# Patient Record
Sex: MALE | Race: WHITE | NOT HISPANIC OR LATINO
[De-identification: names, ages, dates, MRNs, and addresses within clinical notes are randomized per-mention and may not be internally consistent; named-entity substitution may affect disease eponyms.]

---

## 2017-09-19 PROBLEM — Z00.00 ENCOUNTER FOR PREVENTIVE HEALTH EXAMINATION: Status: ACTIVE | Noted: 2017-09-19

## 2017-09-20 ENCOUNTER — APPOINTMENT (OUTPATIENT)
Dept: CT IMAGING | Facility: HOSPITAL | Age: 57
End: 2017-09-20
Payer: MEDICAID

## 2017-09-20 ENCOUNTER — OUTPATIENT (OUTPATIENT)
Dept: OUTPATIENT SERVICES | Facility: HOSPITAL | Age: 57
LOS: 1 days | End: 2017-09-20
Payer: MEDICAID

## 2017-09-20 DIAGNOSIS — R91.1 SOLITARY PULMONARY NODULE: ICD-10-CM

## 2017-09-20 PROCEDURE — 71250 CT THORAX DX C-: CPT

## 2017-09-20 PROCEDURE — 71250 CT THORAX DX C-: CPT | Mod: 26

## 2019-01-10 ENCOUNTER — INPATIENT (INPATIENT)
Facility: HOSPITAL | Age: 59
LOS: 4 days | Discharge: HOME | DRG: 287 | End: 2019-01-15
Attending: INTERNAL MEDICINE | Admitting: INTERNAL MEDICINE
Payer: MEDICAID

## 2019-01-10 VITALS
HEART RATE: 64 BPM | DIASTOLIC BLOOD PRESSURE: 104 MMHG | RESPIRATION RATE: 20 BRPM | OXYGEN SATURATION: 96 % | SYSTOLIC BLOOD PRESSURE: 206 MMHG | WEIGHT: 182.98 LBS

## 2019-01-10 DIAGNOSIS — R06.02 SHORTNESS OF BREATH: ICD-10-CM

## 2019-01-10 LAB
ALBUMIN SERPL ELPH-MCNC: 4.2 G/DL — SIGNIFICANT CHANGE UP (ref 3.3–5)
ALP SERPL-CCNC: 50 U/L — SIGNIFICANT CHANGE UP (ref 40–120)
ALT FLD-CCNC: 15 U/L — SIGNIFICANT CHANGE UP (ref 10–45)
ANION GAP SERPL CALC-SCNC: 12 MMOL/L — SIGNIFICANT CHANGE UP (ref 5–17)
APTT BLD: 33.2 SEC — SIGNIFICANT CHANGE UP (ref 27.5–36.3)
AST SERPL-CCNC: 21 U/L — SIGNIFICANT CHANGE UP (ref 10–40)
BASOPHILS # BLD AUTO: 0 K/UL — SIGNIFICANT CHANGE UP (ref 0–0.2)
BASOPHILS NFR BLD AUTO: 0.4 % — SIGNIFICANT CHANGE UP (ref 0–2)
BILIRUB SERPL-MCNC: 0.7 MG/DL — SIGNIFICANT CHANGE UP (ref 0.2–1.2)
BUN SERPL-MCNC: 28 MG/DL — HIGH (ref 7–23)
CALCIUM SERPL-MCNC: 9.2 MG/DL — SIGNIFICANT CHANGE UP (ref 8.4–10.5)
CHLORIDE SERPL-SCNC: 103 MMOL/L — SIGNIFICANT CHANGE UP (ref 96–108)
CK MB BLD-MCNC: 2.6 % — SIGNIFICANT CHANGE UP (ref 0–3.5)
CK MB CFR SERPL CALC: 4.2 NG/ML — SIGNIFICANT CHANGE UP (ref 0–6.7)
CK SERPL-CCNC: 163 U/L — SIGNIFICANT CHANGE UP (ref 30–200)
CO2 SERPL-SCNC: 25 MMOL/L — SIGNIFICANT CHANGE UP (ref 22–31)
CREAT SERPL-MCNC: 1.65 MG/DL — HIGH (ref 0.5–1.3)
EOSINOPHIL # BLD AUTO: 0.2 K/UL — SIGNIFICANT CHANGE UP (ref 0–0.5)
EOSINOPHIL NFR BLD AUTO: 1.8 % — SIGNIFICANT CHANGE UP (ref 0–6)
GLUCOSE SERPL-MCNC: 101 MG/DL — HIGH (ref 70–99)
HCT VFR BLD CALC: 40.3 % — SIGNIFICANT CHANGE UP (ref 39–50)
HGB BLD-MCNC: 13.4 G/DL — SIGNIFICANT CHANGE UP (ref 13–17)
INR BLD: 1.02 RATIO — SIGNIFICANT CHANGE UP (ref 0.88–1.16)
LYMPHOCYTES # BLD AUTO: 1.4 K/UL — SIGNIFICANT CHANGE UP (ref 1–3.3)
LYMPHOCYTES # BLD AUTO: 14.3 % — SIGNIFICANT CHANGE UP (ref 13–44)
MCHC RBC-ENTMCNC: 30.2 PG — SIGNIFICANT CHANGE UP (ref 27–34)
MCHC RBC-ENTMCNC: 33.2 GM/DL — SIGNIFICANT CHANGE UP (ref 32–36)
MCV RBC AUTO: 91 FL — SIGNIFICANT CHANGE UP (ref 80–100)
MONOCYTES # BLD AUTO: 0.5 K/UL — SIGNIFICANT CHANGE UP (ref 0–0.9)
MONOCYTES NFR BLD AUTO: 4.8 % — SIGNIFICANT CHANGE UP (ref 2–14)
NEUTROPHILS # BLD AUTO: 7.4 K/UL — SIGNIFICANT CHANGE UP (ref 1.8–7.4)
NEUTROPHILS NFR BLD AUTO: 78.7 % — HIGH (ref 43–77)
NT-PROBNP SERPL-SCNC: 4067 PG/ML — HIGH (ref 0–300)
PLATELET # BLD AUTO: 224 K/UL — SIGNIFICANT CHANGE UP (ref 150–400)
POTASSIUM SERPL-MCNC: 4.2 MMOL/L — SIGNIFICANT CHANGE UP (ref 3.5–5.3)
POTASSIUM SERPL-SCNC: 4.2 MMOL/L — SIGNIFICANT CHANGE UP (ref 3.5–5.3)
PROT SERPL-MCNC: 6.8 G/DL — SIGNIFICANT CHANGE UP (ref 6–8.3)
PROTHROM AB SERPL-ACNC: 11.7 SEC — SIGNIFICANT CHANGE UP (ref 10–12.9)
RBC # BLD: 4.43 M/UL — SIGNIFICANT CHANGE UP (ref 4.2–5.8)
RBC # FLD: 12.3 % — SIGNIFICANT CHANGE UP (ref 10.3–14.5)
SODIUM SERPL-SCNC: 140 MMOL/L — SIGNIFICANT CHANGE UP (ref 135–145)
TROPONIN T, HIGH SENSITIVITY RESULT: 25 NG/L — SIGNIFICANT CHANGE UP (ref 0–51)
TROPONIN T, HIGH SENSITIVITY RESULT: 27 NG/L — SIGNIFICANT CHANGE UP (ref 0–51)
TROPONIN T, HIGH SENSITIVITY RESULT: 29 NG/L — SIGNIFICANT CHANGE UP (ref 0–51)
TROPONIN T, HIGH SENSITIVITY RESULT: 30 NG/L — SIGNIFICANT CHANGE UP (ref 0–51)
WBC # BLD: 9.4 K/UL — SIGNIFICANT CHANGE UP (ref 3.8–10.5)
WBC # FLD AUTO: 9.4 K/UL — SIGNIFICANT CHANGE UP (ref 3.8–10.5)

## 2019-01-10 PROCEDURE — 93010 ELECTROCARDIOGRAM REPORT: CPT

## 2019-01-10 PROCEDURE — 99285 EMERGENCY DEPT VISIT HI MDM: CPT | Mod: 25

## 2019-01-10 PROCEDURE — 71046 X-RAY EXAM CHEST 2 VIEWS: CPT | Mod: 26

## 2019-01-10 PROCEDURE — 71250 CT THORAX DX C-: CPT | Mod: 26

## 2019-01-10 RX ORDER — FUROSEMIDE 40 MG
40 TABLET ORAL ONCE
Qty: 0 | Refills: 0 | Status: COMPLETED | OUTPATIENT
Start: 2019-01-10 | End: 2019-01-10

## 2019-01-10 RX ORDER — ASPIRIN/CALCIUM CARB/MAGNESIUM 324 MG
162 TABLET ORAL DAILY
Qty: 0 | Refills: 0 | Status: DISCONTINUED | OUTPATIENT
Start: 2019-01-10 | End: 2019-01-10

## 2019-01-10 RX ORDER — CLOPIDOGREL BISULFATE 75 MG/1
75 TABLET, FILM COATED ORAL DAILY
Qty: 0 | Refills: 0 | Status: DISCONTINUED | OUTPATIENT
Start: 2019-01-10 | End: 2019-01-11

## 2019-01-10 RX ORDER — AMLODIPINE BESYLATE 2.5 MG/1
5 TABLET ORAL ONCE
Qty: 0 | Refills: 0 | Status: COMPLETED | OUTPATIENT
Start: 2019-01-10 | End: 2019-01-10

## 2019-01-10 RX ORDER — ENOXAPARIN SODIUM 100 MG/ML
80 INJECTION SUBCUTANEOUS ONCE
Qty: 0 | Refills: 0 | Status: COMPLETED | OUTPATIENT
Start: 2019-01-10 | End: 2019-01-10

## 2019-01-10 RX ORDER — ISOSORBIDE MONONITRATE 60 MG/1
30 TABLET, EXTENDED RELEASE ORAL DAILY
Qty: 0 | Refills: 0 | Status: DISCONTINUED | OUTPATIENT
Start: 2019-01-10 | End: 2019-01-11

## 2019-01-10 RX ORDER — NICOTINE POLACRILEX 2 MG
1 GUM BUCCAL DAILY
Qty: 0 | Refills: 0 | Status: DISCONTINUED | OUTPATIENT
Start: 2019-01-10 | End: 2019-01-15

## 2019-01-10 RX ORDER — HYDRALAZINE HCL 50 MG
10 TABLET ORAL EVERY 8 HOURS
Qty: 0 | Refills: 0 | Status: DISCONTINUED | OUTPATIENT
Start: 2019-01-10 | End: 2019-01-11

## 2019-01-10 RX ORDER — FUROSEMIDE 40 MG
20 TABLET ORAL DAILY
Qty: 0 | Refills: 0 | Status: DISCONTINUED | OUTPATIENT
Start: 2019-01-11 | End: 2019-01-11

## 2019-01-10 RX ORDER — HEPARIN SODIUM 5000 [USP'U]/ML
5000 INJECTION INTRAVENOUS; SUBCUTANEOUS EVERY 12 HOURS
Qty: 0 | Refills: 0 | Status: DISCONTINUED | OUTPATIENT
Start: 2019-01-10 | End: 2019-01-15

## 2019-01-10 RX ORDER — SIMVASTATIN 20 MG/1
10 TABLET, FILM COATED ORAL AT BEDTIME
Qty: 0 | Refills: 0 | Status: DISCONTINUED | OUTPATIENT
Start: 2019-01-10 | End: 2019-01-11

## 2019-01-10 RX ORDER — ASPIRIN/CALCIUM CARB/MAGNESIUM 324 MG
81 TABLET ORAL DAILY
Qty: 0 | Refills: 0 | Status: DISCONTINUED | OUTPATIENT
Start: 2019-01-10 | End: 2019-01-15

## 2019-01-10 RX ORDER — METOPROLOL TARTRATE 50 MG
25 TABLET ORAL
Qty: 0 | Refills: 0 | Status: DISCONTINUED | OUTPATIENT
Start: 2019-01-10 | End: 2019-01-11

## 2019-01-10 RX ADMIN — Medication 1 PATCH: at 18:45

## 2019-01-10 RX ADMIN — SIMVASTATIN 10 MILLIGRAM(S): 20 TABLET, FILM COATED ORAL at 21:51

## 2019-01-10 RX ADMIN — ENOXAPARIN SODIUM 80 MILLIGRAM(S): 100 INJECTION SUBCUTANEOUS at 13:11

## 2019-01-10 RX ADMIN — Medication 40 MILLIGRAM(S): at 17:19

## 2019-01-10 RX ADMIN — Medication 162 MILLIGRAM(S): at 11:48

## 2019-01-10 RX ADMIN — Medication 25 MILLIGRAM(S): at 17:26

## 2019-01-10 RX ADMIN — HEPARIN SODIUM 5000 UNIT(S): 5000 INJECTION INTRAVENOUS; SUBCUTANEOUS at 17:26

## 2019-01-10 RX ADMIN — ISOSORBIDE MONONITRATE 30 MILLIGRAM(S): 60 TABLET, EXTENDED RELEASE ORAL at 21:51

## 2019-01-10 RX ADMIN — AMLODIPINE BESYLATE 5 MILLIGRAM(S): 2.5 TABLET ORAL at 11:48

## 2019-01-10 RX ADMIN — Medication 1 PATCH: at 21:25

## 2019-01-10 RX ADMIN — Medication 10 MILLIGRAM(S): at 21:51

## 2019-01-10 NOTE — ED ADULT NURSE NOTE - OBJECTIVE STATEMENT
59 y/o Male presenting to the ED by EMS, A&Ox3, complaining of SOB x2 weeks and a flipped T-wave on his EKG. pt Hx of hypertension, blood pressure on assessment 220/108. pt denies chest pain, back pain, dizziness, headache, palpitations, heart racing. pt is a smoker. pt on cardiac monitor, showing NSR to the 60's. EKG completed. Heart sounds WNL, Lung sounds clear bilaterally. Slight pitting edema noted bilaterally on the tibia. pt denies new calf swelling or calf pain. Safety and comfort measures provided. Awaiting lab results and chest x-ray. Family at bedside.

## 2019-01-10 NOTE — H&P ADULT - NSHPLABSRESULTS_GEN_ALL_CORE
LABS:                        13.4   9.4   )-----------( 224      ( 10 Robbin 2019 11:13 )             40.3     01-10    140  |  103  |  28<H>  ----------------------------<  101<H>  4.2   |  25  |  1.65<H>    Ca    9.2      10 Robbin 2019 11:13    TPro  6.8  /  Alb  4.2  /  TBili  0.7  /  DBili  x   /  AST  21  /  ALT  15  /  AlkPhos  50  01-10    PT/INR - ( 10 Robbin 2019 11:13 )   PT: 11.7 sec;   INR: 1.02 ratio         PTT - ( 10 Robbin 2019 11:13 )  PTT:33.2 sec

## 2019-01-10 NOTE — ED PROVIDER NOTE - OBJECTIVE STATEMENT
Pt is a 58 Y M with hx of HTN smoking who presents with increasing SOB with exertion over the past few weeks. Pt states that he has been getting more and more short of breath with activity over the past few weeks, specifically much worse the last 3 days. He admits to severe orthopnea last night that caused him to come in this morning. He denies chest pain, abdominal pain, LOC. He was prescribed amlodipine 5 for BP but doesn't currently have any of it at home. He denies nausea, vomiting, LOC.

## 2019-01-10 NOTE — CONSULT NOTE ADULT - SUBJECTIVE AND OBJECTIVE BOX
CHIEF COMPLAINT:Patient is a 58y old  Male who presents with a chief complaint of sob (10 Robbin 2019 14:23)      HPI:  a 58y Male complaining of SOB.	     58 Y M with hx of HTN smoking who presents with increasing SOB with exertion over the past few weeks.  Pt states that he has been getting more and more short of breath with activity over the past few weeks, specifically much worse the last 3 days. He admits to severe orthopnea last night that caused him to come in this morning.  He denies chest pain, abdominal pain, LOC. He was prescribed amlodipine 5 for BP but doesn't currently have any of it at home. He denies nausea, vomiting, LOC. (10 Robbin 2019 14:23)      PAST MEDICAL & SURGICAL HISTORY:      MEDICATIONS  (STANDING):  aspirin enteric coated 81 milliGRAM(s) Oral daily  heparin  Injectable 5000 Unit(s) SubCutaneous every 12 hours  metoprolol tartrate 25 milliGRAM(s) Oral two times a day  simvastatin 10 milliGRAM(s) Oral at bedtime    MEDICATIONS  (PRN):      FAMILY HISTORY:      SOCIAL HISTORY:    [ ] Non-smoker  [ ] Smoker  [ ] Alcohol    Allergies    No Known Allergies    Intolerances    	    REVIEW OF SYSTEMS:  CONSTITUTIONAL: No fever, weight loss, or fatigue  EYES: No eye pain, visual disturbances, or discharge  ENT:  No difficulty hearing, tinnitus, vertigo; No sinus or throat pain  NECK: No pain or stiffness  RESPIRATORY: No cough, wheezing, chills or hemoptysis; +Shortness of Breath  CARDIOVASCULAR: No chest pain, palpitations, passing out, dizziness, or leg swelling  GASTROINTESTINAL: No abdominal or epigastric pain. No nausea, vomiting, or hematemesis; No diarrhea or constipation. No melena or hematochezia.  GENITOURINARY: No dysuria, frequency, hematuria, or incontinence  NEUROLOGICAL: No headaches, memory loss, loss of strength, numbness, or tremors  SKIN: No itching, burning, rashes, or lesions   LYMPH Nodes: No enlarged glands  ENDOCRINE: No heat or cold intolerance; No hair loss  MUSCULOSKELETAL: No joint pain or swelling; No muscle, back, or extremity pain  PSYCHIATRIC: No depression, anxiety, mood swings, or difficulty sleeping  HEME/LYMPH: No easy bruising, or bleeding gums  ALLERGY AND IMMUNOLOGIC: No hives or eczema	    [ ] All others negative	  [ ] Unable to obtain    PHYSICAL EXAM:  T(C): 36.6 (01-10-19 @ 16:15), Max: 36.7 (01-10-19 @ 12:03)  HR: 71 (01-10-19 @ 16:15) (59 - 82)  BP: 186/90 (01-10-19 @ 16:15) (168/99 - 206/104)  RR: 16 (01-10-19 @ 16:15) (16 - 20)  SpO2: 94% (01-10-19 @ 16:15) (94% - 100%)  Wt(kg): --  I&O's Summary      Appearance: Normal	  HEENT:   Normal oral mucosa, PERRL, EOMI	  Lymphatic: No lymphadenopathy  Cardiovascular: Normal S1 S2, No JVD, + murmurs, No edema  Respiratory: Lungs clear to auscultation	  Psychiatry: A & O x 3, Mood & affect appropriate  Gastrointestinal:  Soft, Non-tender, + BS	  Skin: No rashes, No ecchymoses, No cyanosis	  Neurologic: Non-focal  Extremities: Normal range of motion, No clubbing, cyanosis or edema  Vascular: Peripheral pulses palpable 2+ bilaterally    TELEMETRY: 	    ECG:  	  RADIOLOGY:  OTHER: 	  	  LABS:	 	    CARDIAC MARKERS:                              13.4   9.4   )-----------( 224      ( 10 Robbin 2019 11:13 )             40.3     01-10    140  |  103  |  28<H>  ----------------------------<  101<H>  4.2   |  25  |  1.65<H>    Ca    9.2      10 Robbin 2019 11:13    TPro  6.8  /  Alb  4.2  /  TBili  0.7  /  DBili  x   /  AST  21  /  ALT  15  /  AlkPhos  50  01-10    proBNP: Serum Pro-Brain Natriuretic Peptide: 4067 pg/mL (01-10 @ 11:13)    Lipid Profile:   HgA1c:   TSH:   PT/INR - ( 10 Robbin 2019 11:13 )   PT: 11.7 sec;   INR: 1.02 ratio         PTT - ( 10 Robbin 2019 11:13 )  PTT:33.2 sec    PREVIOUS DIAGNOSTIC TESTING:    < from: 12 Lead ECG (01.10.19 @ 10:55) >  Diagnosis Line NORMAL SINUS RHYTHM  LEFT AXIS DEVIATION  CANNOT RULE OUT ANTEROSEPTAL INFARCT , AGE UNDETERMINED  ST & T WAVE ABNORMALITY, CONSIDER LATERAL ISCHEMIA  ABNORMAL ECG    < from: Xray Chest 2 Views PA/Lat (01.10.19 @ 11:14) >  The heart size is normal.  The cardiomediastinal silhouette is within normal limits.  The lungs are clear.  No pleural effusions or pneumothorax.    IMPRESSION:     Clear lungs.    < from: CT Chest No Cont (09.20.17 @ 09:01) >  1.3 mm left apical pulmonary nodule. If the patient has no risk factors   no follow-up is needed according to Fleischner's criteria.

## 2019-01-10 NOTE — H&P ADULT - HISTORY OF PRESENT ILLNESS
a 58y Male complaining of SOB.	     58 Y M with hx of HTN smoking who presents with increasing SOB with exertion over the past few weeks.  Pt states that he has been getting more and more short of breath with activity over the past few weeks, specifically much worse the last 3 days. He admits to severe orthopnea last night that caused him to come in this morning.  He denies chest pain, abdominal pain, LOC. He was prescribed amlodipine 5 for BP but doesn't currently have any of it at home. He denies nausea, vomiting, LOC.

## 2019-01-10 NOTE — H&P ADULT - ASSESSMENT
pt   with  h/o  htn.  not  on  any  meds    admitted  with  worsening  sob  for  past  few  weeks  from  acute  diastolic  chf/  elevated  bnp, over 4.000  tele   iv lasix/ asa/ statin/ BB  dvt ppx   card  eval  echo  will  need  cath  when  stable  PIO,  follow  labs  in  am

## 2019-01-10 NOTE — ED PROVIDER NOTE - ATTENDING CONTRIBUTION TO CARE
59 y/o male with the above documented history and HPI who on exam appears very well and comfortable. VSs noted, neck supple, lungs CTA, cardiac sounds s/ audible m/r/g, abdomen soft, NT/ND s/ palpable pulsatile mass, extremities s/ asymmetry, calf ttp or palpable cord c/ intact and symmetric peripheral pulses, skin s/ rash or significant edema and neurologically intact. His EKG reveals some non-specific changes in the lateral leads when compared to a prior EKG from his physician's office. Full investigation initiated with a presentation seemingly most c/w ACS (NSTEMI/UA) as opposed to any alternative emergent process, be it cardiac, vascular, pulmonary or otherwise. Will follow his studies but anticipate admission regardless of their results.

## 2019-01-10 NOTE — CONSULT NOTE ADULT - ASSESSMENT
pt with new onset of barcenas/sob and orthopnea.  cardiac enzyme  asa daily  echo  ? cath  bp control  repeat chest ct  tsh  dvt prophylaxis

## 2019-01-11 LAB
ANION GAP SERPL CALC-SCNC: 14 MMOL/L — SIGNIFICANT CHANGE UP (ref 5–17)
BUN SERPL-MCNC: 28 MG/DL — HIGH (ref 7–23)
CALCIUM SERPL-MCNC: 8.4 MG/DL — SIGNIFICANT CHANGE UP (ref 8.4–10.5)
CHLORIDE SERPL-SCNC: 100 MMOL/L — SIGNIFICANT CHANGE UP (ref 96–108)
CHOLEST SERPL-MCNC: 236 MG/DL — HIGH (ref 10–199)
CO2 SERPL-SCNC: 26 MMOL/L — SIGNIFICANT CHANGE UP (ref 22–31)
CREAT SERPL-MCNC: 1.62 MG/DL — HIGH (ref 0.5–1.3)
GLUCOSE SERPL-MCNC: 88 MG/DL — SIGNIFICANT CHANGE UP (ref 70–99)
HCT VFR BLD CALC: 37.2 % — LOW (ref 39–50)
HDLC SERPL-MCNC: 46 MG/DL — SIGNIFICANT CHANGE UP
HGB BLD-MCNC: 12 G/DL — LOW (ref 13–17)
LIPID PNL WITH DIRECT LDL SERPL: 157 MG/DL — HIGH
MCHC RBC-ENTMCNC: 30.1 PG — SIGNIFICANT CHANGE UP (ref 27–34)
MCHC RBC-ENTMCNC: 32.3 GM/DL — SIGNIFICANT CHANGE UP (ref 32–36)
MCV RBC AUTO: 93.2 FL — SIGNIFICANT CHANGE UP (ref 80–100)
PLATELET # BLD AUTO: 242 K/UL — SIGNIFICANT CHANGE UP (ref 150–400)
POTASSIUM SERPL-MCNC: 3.5 MMOL/L — SIGNIFICANT CHANGE UP (ref 3.5–5.3)
POTASSIUM SERPL-SCNC: 3.5 MMOL/L — SIGNIFICANT CHANGE UP (ref 3.5–5.3)
RBC # BLD: 3.99 M/UL — LOW (ref 4.2–5.8)
RBC # FLD: 13.8 % — SIGNIFICANT CHANGE UP (ref 10.3–14.5)
SODIUM SERPL-SCNC: 140 MMOL/L — SIGNIFICANT CHANGE UP (ref 135–145)
TOTAL CHOLESTEROL/HDL RATIO MEASUREMENT: 5.1 RATIO — SIGNIFICANT CHANGE UP (ref 3.4–9.6)
TRIGL SERPL-MCNC: 163 MG/DL — HIGH (ref 10–149)
TROPONIN T, HIGH SENSITIVITY RESULT: 30 NG/L — SIGNIFICANT CHANGE UP (ref 0–51)
TSH SERPL-MCNC: 1.81 UIU/ML — SIGNIFICANT CHANGE UP (ref 0.27–4.2)
WBC # BLD: 8.56 K/UL — SIGNIFICANT CHANGE UP (ref 3.8–10.5)
WBC # FLD AUTO: 8.56 K/UL — SIGNIFICANT CHANGE UP (ref 3.8–10.5)

## 2019-01-11 PROCEDURE — 99152 MOD SED SAME PHYS/QHP 5/>YRS: CPT | Mod: GC

## 2019-01-11 PROCEDURE — 93306 TTE W/DOPPLER COMPLETE: CPT | Mod: 26

## 2019-01-11 PROCEDURE — 93458 L HRT ARTERY/VENTRICLE ANGIO: CPT | Mod: 26,GC

## 2019-01-11 RX ORDER — ATORVASTATIN CALCIUM 80 MG/1
80 TABLET, FILM COATED ORAL AT BEDTIME
Qty: 0 | Refills: 0 | Status: DISCONTINUED | OUTPATIENT
Start: 2019-01-11 | End: 2019-01-11

## 2019-01-11 RX ORDER — METOPROLOL TARTRATE 50 MG
50 TABLET ORAL
Qty: 0 | Refills: 0 | Status: DISCONTINUED | OUTPATIENT
Start: 2019-01-11 | End: 2019-01-12

## 2019-01-11 RX ORDER — ATORVASTATIN CALCIUM 80 MG/1
20 TABLET, FILM COATED ORAL AT BEDTIME
Qty: 0 | Refills: 0 | Status: DISCONTINUED | OUTPATIENT
Start: 2019-01-11 | End: 2019-01-15

## 2019-01-11 RX ORDER — SODIUM CHLORIDE 9 MG/ML
1000 INJECTION INTRAMUSCULAR; INTRAVENOUS; SUBCUTANEOUS
Qty: 0 | Refills: 0 | Status: DISCONTINUED | OUTPATIENT
Start: 2019-01-11 | End: 2019-01-12

## 2019-01-11 RX ORDER — AMLODIPINE BESYLATE 2.5 MG/1
5 TABLET ORAL DAILY
Qty: 0 | Refills: 0 | Status: DISCONTINUED | OUTPATIENT
Start: 2019-01-11 | End: 2019-01-13

## 2019-01-11 RX ORDER — HYDRALAZINE HCL 50 MG
25 TABLET ORAL THREE TIMES A DAY
Qty: 0 | Refills: 0 | Status: DISCONTINUED | OUTPATIENT
Start: 2019-01-11 | End: 2019-01-11

## 2019-01-11 RX ADMIN — Medication 1 PATCH: at 12:21

## 2019-01-11 RX ADMIN — HEPARIN SODIUM 5000 UNIT(S): 5000 INJECTION INTRAVENOUS; SUBCUTANEOUS at 06:06

## 2019-01-11 RX ADMIN — Medication 25 MILLIGRAM(S): at 06:05

## 2019-01-11 RX ADMIN — SODIUM CHLORIDE 60 MILLILITER(S): 9 INJECTION INTRAMUSCULAR; INTRAVENOUS; SUBCUTANEOUS at 22:19

## 2019-01-11 RX ADMIN — Medication 20 MILLIGRAM(S): at 06:05

## 2019-01-11 RX ADMIN — Medication 1 PATCH: at 12:53

## 2019-01-11 RX ADMIN — Medication 81 MILLIGRAM(S): at 12:53

## 2019-01-11 RX ADMIN — Medication 25 MILLIGRAM(S): at 13:38

## 2019-01-11 RX ADMIN — Medication 1 PATCH: at 07:20

## 2019-01-11 RX ADMIN — Medication 1 PATCH: at 20:30

## 2019-01-11 RX ADMIN — AMLODIPINE BESYLATE 5 MILLIGRAM(S): 2.5 TABLET ORAL at 18:09

## 2019-01-11 RX ADMIN — Medication 10 MILLIGRAM(S): at 06:05

## 2019-01-11 RX ADMIN — Medication 50 MILLIGRAM(S): at 18:09

## 2019-01-11 RX ADMIN — ISOSORBIDE MONONITRATE 30 MILLIGRAM(S): 60 TABLET, EXTENDED RELEASE ORAL at 12:53

## 2019-01-11 RX ADMIN — ATORVASTATIN CALCIUM 20 MILLIGRAM(S): 80 TABLET, FILM COATED ORAL at 21:00

## 2019-01-11 RX ADMIN — CLOPIDOGREL BISULFATE 75 MILLIGRAM(S): 75 TABLET, FILM COATED ORAL at 12:53

## 2019-01-11 NOTE — PROGRESS NOTE ADULT - SUBJECTIVE AND OBJECTIVE BOX
CARDIOLOGY     PROGRESS  NOTE   ________________________________________________    CHIEF COMPLAINT:Patient is a 58y old  Male who presents with a chief complaint of sob (10 Robbin 2019 17:46)  no complain.  	  REVIEW OF SYSTEMS:  CONSTITUTIONAL: No fever, weight loss, or fatigue  EYES: No eye pain, visual disturbances, or discharge  ENT:  No difficulty hearing, tinnitus, vertigo; No sinus or throat pain  NECK: No pain or stiffness  RESPIRATORY: No cough, wheezing, chills or hemoptysis; + exertional  Shortness of Breath  CARDIOVASCULAR: No chest pain, palpitations, passing out, dizziness, or leg swelling  GASTROINTESTINAL: No abdominal or epigastric pain. No nausea, vomiting, or hematemesis; No diarrhea or constipation. No melena or hematochezia.  GENITOURINARY: No dysuria, frequency, hematuria, or incontinence  NEUROLOGICAL: No headaches, memory loss, loss of strength, numbness, or tremors  SKIN: No itching, burning, rashes, or lesions   LYMPH Nodes: No enlarged glands  ENDOCRINE: No heat or cold intolerance; No hair loss  MUSCULOSKELETAL: No joint pain or swelling; No muscle, back, or extremity pain  PSYCHIATRIC: No depression, anxiety, mood swings, or difficulty sleeping  HEME/LYMPH: No easy bruising, or bleeding gums  ALLERGY AND IMMUNOLOGIC: No hives or eczema	    [ ] All others negative	  [ ] Unable to obtain    PHYSICAL EXAM:  T(C): 36.9 (01-11-19 @ 04:14), Max: 36.9 (01-11-19 @ 04:14)  HR: 70 (01-11-19 @ 04:14) (59 - 82)  BP: 185/89 (01-11-19 @ 04:14) (146/85 - 206/104)  RR: 18 (01-11-19 @ 04:14) (16 - 20)  SpO2: 96% (01-11-19 @ 04:14) (94% - 100%)  Wt(kg): --  I&O's Summary    10 Robbin 2019 07:01  -  11 Jan 2019 07:00  --------------------------------------------------------  IN: 50 mL / OUT: 0 mL / NET: 50 mL        Appearance: Normal	  HEENT:   Normal oral mucosa, PERRL, EOMI	  Lymphatic: No lymphadenopathy  Cardiovascular: Normal S1 S2, No JVD, + murmurs, No edema  Respiratory: Lungs clear to auscultation	  Psychiatry: A & O x 3, Mood & affect appropriate  Gastrointestinal:  Soft, Non-tender, + BS	  Skin: No rashes, No ecchymoses, No cyanosis	  Neurologic: Non-focal  Extremities: Normal range of motion, No clubbing, cyanosis or edema  Vascular: Peripheral pulses palpable 2+ bilaterally    MEDICATIONS  (STANDING):  aspirin enteric coated 81 milliGRAM(s) Oral daily  clopidogrel Tablet 75 milliGRAM(s) Oral daily  furosemide   Injectable 20 milliGRAM(s) IV Push daily  heparin  Injectable 5000 Unit(s) SubCutaneous every 12 hours  hydrALAZINE 10 milliGRAM(s) Oral every 8 hours  isosorbide   mononitrate ER Tablet (IMDUR) 30 milliGRAM(s) Oral daily  metoprolol tartrate 25 milliGRAM(s) Oral two times a day  nicotine - 21 mG/24Hr(s) Patch 1 patch Transdermal daily  simvastatin 10 milliGRAM(s) Oral at bedtime      TELEMETRY: 	    ECG:  	  RADIOLOGY:  OTHER: 	  	  LABS:	 	    CARDIAC MARKERS:  CARDIAC MARKERS ( 10 Robbin 2019 18:36 )  x     / x     / 163 U/L / x     / 4.2 ng/mL    < from: CT Chest No Cont (09.20.17 @ 09:01) >  1.3 mm left apical pulmonary nodule. If the patient has no risk factors   no follow-up is needed according to Fleischner's criteria.    < end of copied text >                              13.4   9.4   )-----------( 224      ( 10 Robbin 2019 11:13 )             40.3     01-11    140  |  100  |  28<H>  ----------------------------<  88  3.5   |  26  |  1.62<H>    Ca    8.4      11 Jan 2019 04:41    TPro  6.8  /  Alb  4.2  /  TBili  0.7  /  DBili  x   /  AST  21  /  ALT  15  /  AlkPhos  50  01-10    proBNP: Serum Pro-Brain Natriuretic Peptide: 4067 pg/mL (01-10 @ 11:13)    Lipid Profile:   HgA1c:   TSH:   PT/INR - ( 10 Robbin 2019 11:13 )   PT: 11.7 sec;   INR: 1.02 ratio         PTT - ( 10 Robbin 2019 11:13 )  PTT:33.2 sec      Assessment and plan  ---------------------------  dc lasix  will adjust cardiac meds  asa/plavix  cath today

## 2019-01-11 NOTE — PROGRESS NOTE ADULT - SUBJECTIVE AND OBJECTIVE BOX
no  cp/ less  sob    REVIEW OF SYSTEMS:  GEN: no fever,    no chills  RESP: no SOB,   no cough  CVS: no chest pain,   no palpitations  GI: no abdominal pain,   no nausea,   no vomiting,   no constipation,   no diarrhea  : no dysuria,   no frequency  NEURO: no headache,   no dizziness  PSYCH: no depression,   not anxious  Derm : no rash    MEDICATIONS  (STANDING):  aspirin enteric coated 81 milliGRAM(s) Oral daily  atorvastatin 80 milliGRAM(s) Oral at bedtime  clopidogrel Tablet 75 milliGRAM(s) Oral daily  heparin  Injectable 5000 Unit(s) SubCutaneous every 12 hours  hydrALAZINE 25 milliGRAM(s) Oral three times a day  isosorbide   mononitrate ER Tablet (IMDUR) 30 milliGRAM(s) Oral daily  metoprolol tartrate 25 milliGRAM(s) Oral two times a day  nicotine - 21 mG/24Hr(s) Patch 1 patch Transdermal daily  sodium chloride 0.9%. 1000 milliLiter(s) (60 mL/Hr) IV Continuous <Continuous>    MEDICATIONS  (PRN):      Vital Signs Last 24 Hrs  T(C): 36.9 (11 Jan 2019 04:14), Max: 36.9 (11 Jan 2019 04:14)  T(F): 98.5 (11 Jan 2019 04:14), Max: 98.5 (11 Jan 2019 04:14)  HR: 70 (11 Jan 2019 08:58) (59 - 82)  BP: 147/80 (11 Jan 2019 08:58) (146/85 - 206/104)  BP(mean): --  RR: 18 (11 Jan 2019 04:14) (16 - 20)  SpO2: 96% (11 Jan 2019 04:14) (94% - 100%)  CAPILLARY BLOOD GLUCOSE        I&O's Summary    10 Robbin 2019 07:01  -  11 Jan 2019 07:00  --------------------------------------------------------  IN: 50 mL / OUT: 0 mL / NET: 50 mL    11 Jan 2019 07:01  -  11 Jan 2019 09:54  --------------------------------------------------------  IN: 0 mL / OUT: 400 mL / NET: -400 mL        PHYSICAL EXAM:  HEAD:  Atraumatic, Normocephalic  NECK: Supple, No   JVD  CHEST/LUNG:   no     rales,     no,    rhonchi  HEART: Regular rate and rhythm;         murmur  ABDOMEN: Soft, Nontender, ;   EXTREMITIES:     less   edema  NEUROLOGY:  alert    LABS:                        12.0   8.56  )-----------( 242      ( 11 Jan 2019 08:25 )             37.2     01-11    140  |  100  |  28<H>  ----------------------------<  88  3.5   |  26  |  1.62<H>    Ca    8.4      11 Jan 2019 04:41    TPro  6.8  /  Alb  4.2  /  TBili  0.7  /  DBili  x   /  AST  21  /  ALT  15  /  AlkPhos  50  01-10    PT/INR - ( 10 Robbin 2019 11:13 )   PT: 11.7 sec;   INR: 1.02 ratio         PTT - ( 10 Robbin 2019 11:13 )  PTT:33.2 sec  CARDIAC MARKERS ( 10 Robbin 2019 18:36 )  x     / x     / 163 U/L / x     / 4.2 ng/mL                        Consultant(s) Notes Reviewed:      Care Discussed with Consultants/Other Providers:

## 2019-01-11 NOTE — PROGRESS NOTE ADULT - ASSESSMENT
pt   with  h/o  htn.  not  on  any  meds    admitted  with  worsening  sob  for  past  few  weeks  from  acute  diastolic  chf/  elevated  bnp, over 4.000  tele,  nsr   iv lasix/ asa/ statin/ BB  dvt ppx   card   echo  cath today  PIO,  follow  labs  in  am pt   with  h/o  htn.  not  on  any  meds    admitted  with  worsening  sob  for  past  few  weeks  from  acute  diastolic  chf/  elevated  bnp, over 4.000  tele,  nsr   iv lasix/ asa/ statin/ BB  dvt ppx   card   echo  cath today  PIO,  follow  labs  in  am  ct  chest  noted.  needs  f/p  ct in few  months     < from: CT Chest No Cont (01.10.19 @ 23:28) >    IMPRESSION:   Small pleural and small pericardial effusions are new from 2017.   Increased number of nonenlarged mediastinal lymph nodes are slightly   increased in size and number compared to the prior study.  2 mm left apical nodules unchanged.  < end of copied text >

## 2019-01-12 LAB
ANION GAP SERPL CALC-SCNC: 14 MMOL/L — SIGNIFICANT CHANGE UP (ref 5–17)
BUN SERPL-MCNC: 29 MG/DL — HIGH (ref 7–23)
CALCIUM SERPL-MCNC: 8.6 MG/DL — SIGNIFICANT CHANGE UP (ref 8.4–10.5)
CHLORIDE SERPL-SCNC: 102 MMOL/L — SIGNIFICANT CHANGE UP (ref 96–108)
CO2 SERPL-SCNC: 22 MMOL/L — SIGNIFICANT CHANGE UP (ref 22–31)
CREAT SERPL-MCNC: 1.45 MG/DL — HIGH (ref 0.5–1.3)
GLUCOSE SERPL-MCNC: 86 MG/DL — SIGNIFICANT CHANGE UP (ref 70–99)
HCT VFR BLD CALC: 38.5 % — LOW (ref 39–50)
HGB BLD-MCNC: 12.5 G/DL — LOW (ref 13–17)
MCHC RBC-ENTMCNC: 30.2 PG — SIGNIFICANT CHANGE UP (ref 27–34)
MCHC RBC-ENTMCNC: 32.5 GM/DL — SIGNIFICANT CHANGE UP (ref 32–36)
MCV RBC AUTO: 93 FL — SIGNIFICANT CHANGE UP (ref 80–100)
PLATELET # BLD AUTO: 249 K/UL — SIGNIFICANT CHANGE UP (ref 150–400)
POTASSIUM SERPL-MCNC: 3.4 MMOL/L — LOW (ref 3.5–5.3)
POTASSIUM SERPL-SCNC: 3.4 MMOL/L — LOW (ref 3.5–5.3)
RBC # BLD: 4.14 M/UL — LOW (ref 4.2–5.8)
RBC # FLD: 13.7 % — SIGNIFICANT CHANGE UP (ref 10.3–14.5)
SODIUM SERPL-SCNC: 138 MMOL/L — SIGNIFICANT CHANGE UP (ref 135–145)
WBC # BLD: 9.37 K/UL — SIGNIFICANT CHANGE UP (ref 3.8–10.5)
WBC # FLD AUTO: 9.37 K/UL — SIGNIFICANT CHANGE UP (ref 3.8–10.5)

## 2019-01-12 PROCEDURE — 74176 CT ABD & PELVIS W/O CONTRAST: CPT | Mod: 26

## 2019-01-12 PROCEDURE — 99223 1ST HOSP IP/OBS HIGH 75: CPT

## 2019-01-12 RX ORDER — METOPROLOL TARTRATE 50 MG
75 TABLET ORAL
Qty: 0 | Refills: 0 | Status: DISCONTINUED | OUTPATIENT
Start: 2019-01-12 | End: 2019-01-15

## 2019-01-12 RX ORDER — POTASSIUM CHLORIDE 20 MEQ
10 PACKET (EA) ORAL ONCE
Qty: 0 | Refills: 0 | Status: COMPLETED | OUTPATIENT
Start: 2019-01-12 | End: 2019-01-12

## 2019-01-12 RX ADMIN — SODIUM CHLORIDE 60 MILLILITER(S): 9 INJECTION INTRAMUSCULAR; INTRAVENOUS; SUBCUTANEOUS at 09:26

## 2019-01-12 RX ADMIN — Medication 50 MILLIGRAM(S): at 06:13

## 2019-01-12 RX ADMIN — Medication 1 PATCH: at 07:02

## 2019-01-12 RX ADMIN — HEPARIN SODIUM 5000 UNIT(S): 5000 INJECTION INTRAVENOUS; SUBCUTANEOUS at 17:52

## 2019-01-12 RX ADMIN — HEPARIN SODIUM 5000 UNIT(S): 5000 INJECTION INTRAVENOUS; SUBCUTANEOUS at 06:13

## 2019-01-12 RX ADMIN — Medication 1 PATCH: at 12:26

## 2019-01-12 RX ADMIN — Medication 10 MILLIEQUIVALENT(S): at 12:38

## 2019-01-12 RX ADMIN — Medication 1 PATCH: at 12:38

## 2019-01-12 RX ADMIN — ATORVASTATIN CALCIUM 20 MILLIGRAM(S): 80 TABLET, FILM COATED ORAL at 21:19

## 2019-01-12 RX ADMIN — Medication 75 MILLIGRAM(S): at 17:52

## 2019-01-12 RX ADMIN — Medication 1 PATCH: at 21:55

## 2019-01-12 RX ADMIN — Medication 81 MILLIGRAM(S): at 12:38

## 2019-01-12 RX ADMIN — AMLODIPINE BESYLATE 5 MILLIGRAM(S): 2.5 TABLET ORAL at 06:13

## 2019-01-12 RX ADMIN — Medication 1 PATCH: at 11:12

## 2019-01-12 NOTE — PROGRESS NOTE ADULT - ASSESSMENT
pt   with  h/o  htn.  not  on  any  meds    admitted  with  worsening  sob  for  past  few  weeks  from  acute  diastolic  chf/  elevated  bnp, over 4.000  tele,  nsr   iv lasix/ asa/ statin/ BB  dvt ppx   card   echo, normal  ef  cath ,  no obstructive  cad  ct  chest  noted.  needs  f/p  ct in few  months  plan d.c today      < from: Transthoracic Echocardiogram (01.11.19 @ 08:38) >  1. Mitral annular calcification. Thickened mitral valve  leaflets. Mild-moderate mitral regurgitation.  2. Calcified aortic valve with decreased opening. Peak  transaortic valve gradient equals 29 mm Hg, mean  transaortic valve gradient equals 14 mm Hg, estimated  aortic valve area equals 1.5 sqcm (by continuity equation),  aortic valve velocity time integral equals 50 cm,  consistent with moderate aortic stenosis. Mild, eccentric  aortic regurgitation.  3. Moderate left atrial enlargement.  4. Mild to moderate concentric left ventricular  hypertrophy.  5. Normal left ventricular systolic function. No segmental  wall motion abnormalities.  6. Mild diastolic dysfunction (Stage I).  7. Normal right ventricular size and function.  8. Normal pericardium with small pericardial effusion.  There is some buckling of the right atrium in diastole, but  no right ventricular collapse. The inferior vena cava  collapses with respiration. No evidence of tamponade  physiology.    < end of copied text >       < from: CT Chest No Cont (01.10.19 @ 23:28) >    IMPRESSION:   Small pleural and small pericardial effusions are new from 2017.   Increased number of nonenlarged mediastinal lymph nodes are slightly   increased in size and number compared to the prior study.  2 mm left apical nodules unchanged.  < end of copied text >

## 2019-01-12 NOTE — CONSULT NOTE ADULT - ASSESSMENT
59 y/o M smoker admitted for dyspnea found to have HFpEF and stable 2mm pulmonary nodule.    - Can repeat CT scan in 12 months if desired  - Continue diruesis   - Please recall with any questions 57 y/o M smoker admitted for dyspnea found to have HFpEF and stable 2mm pulmonary nodule.    - Can repeat CT scan in 12 months if desired  - Continue diruesis   - Smoking cessation  - Please recall with any questions

## 2019-01-12 NOTE — PROGRESS NOTE ADULT - SUBJECTIVE AND OBJECTIVE BOX
no  cp.sob  REVIEW OF SYSTEMS:  GEN: no fever,    no chills  RESP: no SOB,   no cough  CVS: no chest pain,   no palpitations  GI: no abdominal pain,   no nausea,   no vomiting,   no constipation,   no diarrhea  : no dysuria,   no frequency  NEURO: no headache,   no dizziness  PSYCH: no depression,   not anxious  Derm : no rash    MEDICATIONS  (STANDING):  amLODIPine   Tablet 5 milliGRAM(s) Oral daily  aspirin enteric coated 81 milliGRAM(s) Oral daily  atorvastatin 20 milliGRAM(s) Oral at bedtime  heparin  Injectable 5000 Unit(s) SubCutaneous every 12 hours  metoprolol tartrate 50 milliGRAM(s) Oral two times a day  nicotine - 21 mG/24Hr(s) Patch 1 patch Transdermal daily  sodium chloride 0.9%. 1000 milliLiter(s) (60 mL/Hr) IV Continuous <Continuous>    MEDICATIONS  (PRN):      Vital Signs Last 24 Hrs  T(C): 36.8 (12 Jan 2019 04:27), Max: 36.8 (12 Jan 2019 04:27)  T(F): 98.3 (12 Jan 2019 04:27), Max: 98.3 (12 Jan 2019 04:27)  HR: 65 (12 Jan 2019 06:08) (52 - 73)  BP: 159/87 (12 Jan 2019 06:08) (144/75 - 167/83)  BP(mean): --  RR: 19 (12 Jan 2019 04:27) (16 - 19)  SpO2: 94% (12 Jan 2019 04:27) (94% - 98%)  CAPILLARY BLOOD GLUCOSE        I&O's Summary    11 Jan 2019 07:01  -  12 Jan 2019 07:00  --------------------------------------------------------  IN: 1370 mL / OUT: 400 mL / NET: 970 mL        PHYSICAL EXAM:  HEAD:  Atraumatic, Normocephalic  NECK: Supple, No   JVD  CHEST/LUNG:   no     rales,     no,    rhonchi  HEART: Regular rate and rhythm;         murmur  ABDOMEN: Soft, Nontender, ;   EXTREMITIES:   no     edema  NEUROLOGY:  alert    LABS:                        12.5   9.37  )-----------( 249      ( 12 Jan 2019 07:14 )             38.5     01-12    138  |  102  |  29<H>  ----------------------------<  86  3.4<L>   |  22  |  1.45<H>    Ca    8.6      12 Jan 2019 05:56    TPro  6.8  /  Alb  4.2  /  TBili  0.7  /  DBili  x   /  AST  21  /  ALT  15  /  AlkPhos  50  01-10    PT/INR - ( 10 Robbin 2019 11:13 )   PT: 11.7 sec;   INR: 1.02 ratio         PTT - ( 10 Robbin 2019 11:13 )  PTT:33.2 sec  CARDIAC MARKERS ( 10 Robbin 2019 18:36 )  x     / x     / 163 U/L / x     / 4.2 ng/mL                Thyroid Stimulating Hormone, Serum: 1.81 uIU/mL (01-11 @ 08:30)          Consultant(s) Notes Reviewed:      Care Discussed with Consultants/Other Providers:

## 2019-01-12 NOTE — CONSULT NOTE ADULT - SUBJECTIVE AND OBJECTIVE BOX
CHIEF COMPLAINT: SOB    HPI: 59 y/o smoker w/no significant PMH, 2mm L apical pulmonary nodule seen on CT scan 9/2018 presenting with dyspnea, found to have HFpEF currently receiving diuresis, found to have stable pulmonary nodule on repeat chest CT.    REVIEW OF SYSTEMS:  See above. ROS otherwise negative    PAST MEDICAL & SURGICAL HISTORY:  HTN    FAMILY HISTORY:      SOCIAL HISTORY:  Smoking: [ ] Never Smoked [ ] Former Smoker (__ packs x ___ years) [ ] Current Smoker  (__ packs x ___ years)  Substance Use: [ ] Never Used [ ] Used ____  EtOH Use:  Marital Status: [ ] Single [ ]  [ ]  [ ]   Sexual History:   Occupation:  Recent Travel:  Country of Birth:  Advance Directives:    Allergies    No Known Allergies    Intolerances        HOME MEDICATIONS:  Home Medications:        OBJECTIVE:  ICU Vital Signs Last 24 Hrs  T(C): 36.9 (12 Jan 2019 11:15), Max: 36.9 (12 Jan 2019 11:15)  T(F): 98.5 (12 Jan 2019 11:15), Max: 98.5 (12 Jan 2019 11:15)  HR: 7 (12 Jan 2019 11:15) (7 - 73)  BP: 157/93 (12 Jan 2019 11:15) (144/75 - 162/85)  BP(mean): --  ABP: --  ABP(mean): --  RR: 18 (12 Jan 2019 11:15) (16 - 19)  SpO2: 95% (12 Jan 2019 11:15) (94% - 98%)        01-11 @ 07:01  -  01-12 @ 07:00  --------------------------------------------------------  IN: 1370 mL / OUT: 400 mL / NET: 970 mL      CAPILLARY BLOOD GLUCOSE          PHYSICAL EXAM:  General:   HEENT:   Lymph Nodes:  Neck:   Respiratory:   Cardiovascular:   Abdomen:   Extremities:   Skin:   Neurological:  Psychiatry:    HOSPITAL MEDICATIONS:  Standing Meds:  amLODIPine   Tablet 5 milliGRAM(s) Oral daily  aspirin enteric coated 81 milliGRAM(s) Oral daily  atorvastatin 20 milliGRAM(s) Oral at bedtime  heparin  Injectable 5000 Unit(s) SubCutaneous every 12 hours  metoprolol tartrate 75 milliGRAM(s) Oral two times a day  nicotine - 21 mG/24Hr(s) Patch 1 patch Transdermal daily      PRN Meds:      LABS:                        12.5   9.37  )-----------( 249      ( 12 Jan 2019 07:14 )             38.5     Hgb Trend: 12.5<--, 12.0<--, 13.4<--  01-12    138  |  102  |  29<H>  ----------------------------<  86  3.4<L>   |  22  |  1.45<H>    Ca    8.6      12 Jan 2019 05:56      Creatinine Trend: 1.45<--, 1.62<--, 1.65<--            MICROBIOLOGY:       RADIOLOGY:  [x ] Reviewed and interpreted by me    < from: CT Chest No Cont (01.10.19 @ 23:28) >  FINDINGS:     AIRWAYS AND LUNGS: The central tracheobronchial tree is patent.  2 mm   left apical nodule is unchanged (3, 23).    MEDIASTINUM AND PLEURA: Increased number of nonenlarged mediastinal lymph   nodes are slightly increased in size and number compared to the prior   study. The visualized portion of the thyroid gland is unremarkable. There   are small bilateral pleural effusions.  There is no pneumothorax.      HEART AND VESSELS: The heart is normal in size.  There are   atherosclerotic calcifications of the aorta and coronary arteries.  There   is a small pericardial effusion.  Aortic valve calcifications    UPPER ABDOMEN: Images of the upper abdomen demonstrate no abnormality.     BONES AND SOFT TISSUES: There are mild degenerative changes of the spine.    The soft tissues are unremarkable.    IMPRESSION:   Small pleural and small pericardial effusions are new from 2017.   Increased number of nonenlarged mediastinal lymph nodes are slightly   increased in size and number compared to the prior study.    2 mm left apical nodules unchanged.    < end of copied text >      PULMONARY FUNCTION TESTS:    EKG: CHIEF COMPLAINT: SOB    HPI: 59 y/o smoker w/no significant PMH, 2mm L apical pulmonary nodule seen on CT scan 9/2018 presenting with dyspnea, found to have HFpEF currently receiving diuresis, found to have stable pulmonary nodule on repeat chest CT. Currently feels well. Breathing significantly improved from admission, however still has some dyspnea on exertion. No chest pain. No fevers, chills, nausea, emesis, or weight loss.     REVIEW OF SYSTEMS:  See above. ROS otherwise negative    PAST MEDICAL & SURGICAL HISTORY:  HTN    FAMILY HISTORY:  No family history of cancer    SOCIAL HISTORY:  Smoker 1ppd x ~ 40 years    Allergies    No Known Allergies    Intolerances        HOME MEDICATIONS:  Home Medications:        OBJECTIVE:  ICU Vital Signs Last 24 Hrs  T(C): 36.9 (12 Jan 2019 11:15), Max: 36.9 (12 Jan 2019 11:15)  T(F): 98.5 (12 Jan 2019 11:15), Max: 98.5 (12 Jan 2019 11:15)  HR: 7 (12 Jan 2019 11:15) (7 - 73)  BP: 157/93 (12 Jan 2019 11:15) (144/75 - 162/85)  BP(mean): --  ABP: --  ABP(mean): --  RR: 18 (12 Jan 2019 11:15) (16 - 19)  SpO2: 95% (12 Jan 2019 11:15) (94% - 98%)        01-11 @ 07:01  -  01-12 @ 07:00  --------------------------------------------------------  IN: 1370 mL / OUT: 400 mL / NET: 970 mL      CAPILLARY BLOOD GLUCOSE          PHYSICAL EXAM:  General: Adult male sitting comfortably in suad, NAD  HEENT: NC/AT sclerae anicteric  Neck: Supple  Respiratory: No increased WOB, CTAB  Cardiovascular: S1, S2  Abdomen: Soft, + BS  Extremities: WWP  Skin: Intact  Neurological: Awake, alert, follows commands, moves all extremities  Psychiatry: Appropriate affect    HOSPITAL MEDICATIONS:  Standing Meds:  amLODIPine   Tablet 5 milliGRAM(s) Oral daily  aspirin enteric coated 81 milliGRAM(s) Oral daily  atorvastatin 20 milliGRAM(s) Oral at bedtime  heparin  Injectable 5000 Unit(s) SubCutaneous every 12 hours  metoprolol tartrate 75 milliGRAM(s) Oral two times a day  nicotine - 21 mG/24Hr(s) Patch 1 patch Transdermal daily      PRN Meds:      LABS:                        12.5   9.37  )-----------( 249      ( 12 Jan 2019 07:14 )             38.5     Hgb Trend: 12.5<--, 12.0<--, 13.4<--  01-12    138  |  102  |  29<H>  ----------------------------<  86  3.4<L>   |  22  |  1.45<H>    Ca    8.6      12 Jan 2019 05:56      Creatinine Trend: 1.45<--, 1.62<--, 1.65<--            MICROBIOLOGY:       RADIOLOGY:  [x ] Reviewed and interpreted by me    < from: CT Chest No Cont (01.10.19 @ 23:28) >  FINDINGS:     AIRWAYS AND LUNGS: The central tracheobronchial tree is patent.  2 mm   left apical nodule is unchanged (3, 23).    MEDIASTINUM AND PLEURA: Increased number of nonenlarged mediastinal lymph   nodes are slightly increased in size and number compared to the prior   study. The visualized portion of the thyroid gland is unremarkable. There   are small bilateral pleural effusions.  There is no pneumothorax.      HEART AND VESSELS: The heart is normal in size.  There are   atherosclerotic calcifications of the aorta and coronary arteries.  There   is a small pericardial effusion.  Aortic valve calcifications    UPPER ABDOMEN: Images of the upper abdomen demonstrate no abnormality.     BONES AND SOFT TISSUES: There are mild degenerative changes of the spine.    The soft tissues are unremarkable.    IMPRESSION:   Small pleural and small pericardial effusions are new from 2017.   Increased number of nonenlarged mediastinal lymph nodes are slightly   increased in size and number compared to the prior study.    2 mm left apical nodules unchanged.    < end of copied text >      PULMONARY FUNCTION TESTS:    EKG:

## 2019-01-12 NOTE — PROGRESS NOTE ADULT - SUBJECTIVE AND OBJECTIVE BOX
CARDIOLOGY     PROGRESS  NOTE   ________________________________________________    CHIEF COMPLAINT:Patient is a 58y old  Male who presents with a chief complaint of sob (12 Jan 2019 09:19)  no complain.  	  REVIEW OF SYSTEMS:  CONSTITUTIONAL: No fever, weight loss, or fatigue  EYES: No eye pain, visual disturbances, or discharge  ENT:  No difficulty hearing, tinnitus, vertigo; No sinus or throat pain  NECK: No pain or stiffness  RESPIRATORY: No cough, wheezing, chills or hemoptysis; No Shortness of Breath  CARDIOVASCULAR: No chest pain, palpitations, passing out, dizziness, or leg swelling  GASTROINTESTINAL: No abdominal or epigastric pain. No nausea, vomiting, or hematemesis; No diarrhea or constipation. No melena or hematochezia.  GENITOURINARY: No dysuria, frequency, hematuria, or incontinence  NEUROLOGICAL: No headaches, memory loss, loss of strength, numbness, or tremors  SKIN: No itching, burning, rashes, or lesions   LYMPH Nodes: No enlarged glands  ENDOCRINE: No heat or cold intolerance; No hair loss  MUSCULOSKELETAL: No joint pain or swelling; No muscle, back, or extremity pain  PSYCHIATRIC: No depression, anxiety, mood swings, or difficulty sleeping  HEME/LYMPH: No easy bruising, or bleeding gums  ALLERGY AND IMMUNOLOGIC: No hives or eczema	    [ ] All others negative	  [ ] Unable to obtain    PHYSICAL EXAM:  T(C): 36.8 (01-12-19 @ 04:27), Max: 36.8 (01-12-19 @ 04:27)  HR: 65 (01-12-19 @ 06:08) (52 - 73)  BP: 159/87 (01-12-19 @ 06:08) (144/75 - 167/83)  RR: 19 (01-12-19 @ 04:27) (16 - 19)  SpO2: 94% (01-12-19 @ 04:27) (94% - 98%)  Wt(kg): --  I&O's Summary    11 Jan 2019 07:01  -  12 Jan 2019 07:00  --------------------------------------------------------  IN: 1370 mL / OUT: 400 mL / NET: 970 mL        Appearance: Normal	  HEENT:   Normal oral mucosa, PERRL, EOMI	  Lymphatic: No lymphadenopathy  Cardiovascular: Normal S1 S2, No JVD, + murmurs, No edema  Respiratory: Lungs clear to auscultation	  Psychiatry: A & O x 3, Mood & affect appropriate  Gastrointestinal:  Soft, Non-tender, + BS	  Skin: No rashes, No ecchymoses, No cyanosis	  Neurologic: Non-focal  Extremities: Normal range of motion, No clubbing, cyanosis or edema  Vascular: Peripheral pulses palpable 2+ bilaterally    MEDICATIONS  (STANDING):  amLODIPine   Tablet 5 milliGRAM(s) Oral daily  aspirin enteric coated 81 milliGRAM(s) Oral daily  atorvastatin 20 milliGRAM(s) Oral at bedtime  heparin  Injectable 5000 Unit(s) SubCutaneous every 12 hours  metoprolol tartrate 50 milliGRAM(s) Oral two times a day  nicotine - 21 mG/24Hr(s) Patch 1 patch Transdermal daily      TELEMETRY: 	    ECG:  	  RADIOLOGY:  OTHER: 	  	  LABS:	 	    CARDIAC MARKERS:  CARDIAC MARKERS ( 10 Robbin 2019 18:36 )  x     / x     / 163 U/L / x     / 4.2 ng/mL                                12.5   9.37  )-----------( 249      ( 12 Jan 2019 07:14 )             38.5     01-12    138  |  102  |  29<H>  ----------------------------<  86  3.4<L>   |  22  |  1.45<H>    Ca    8.6      12 Jan 2019 05:56    TPro  6.8  /  Alb  4.2  /  TBili  0.7  /  DBili  x   /  AST  21  /  ALT  15  /  AlkPhos  50  01-10    proBNP: Serum Pro-Brain Natriuretic Peptide: 4067 pg/mL (01-10 @ 11:13)    Lipid Profile: Cholesterol 236    HDL 46      HgA1c:   TSH: Thyroid Stimulating Hormone, Serum: 1.81 uIU/mL (01-11 @ 08:30)    PT/INR - ( 10 Robbin 2019 11:13 )   PT: 11.7 sec;   INR: 1.02 ratio         PTT - ( 10 Robbin 2019 11:13 )  PTT:33.2 sec    < from: Cardiac Cath Lab - Adult (01.11.19 @ 16:56) >  (Trandate), 20 mg, IV. Heparin, 3000 units, IA.  CORONARY VESSELS: The coronary circulation is right dominant.  LM:   --  LM: Normal.  LAD:   --  LAD: Normal.  CX:   --  Circumflex: Normal.  RCA:   --  Mid RCA: There was a 30 % stenosis.  COMPLICATIONS: There were no complications.  DIAGNOSTIC RECOMMENDATIONS: The patient should continue with the present  medications.    < from: Transthoracic Echocardiogram (01.11.19 @ 08:38) >  1. Mitral annular calcification. Thickened mitral valve  leaflets. Mild-moderate mitral regurgitation.  2. Calcified aortic valve with decreased opening. Peak  transaortic valve gradient equals 29 mm Hg, mean  transaortic valve gradient equals 14 mm Hg, estimated  aortic valve area equals 1.5 sqcm (by continuity equation),  aortic valve velocity time integral equals 50 cm,  consistent with moderate aortic stenosis. Mild, eccentric  aortic regurgitation.  3. Moderate left atrial enlargement.  4. Mild to moderate concentric left ventricular  hypertrophy.  5. Normal left ventricular systolic function. No segmental  wall motion abnormalities.  6. Mild diastolic dysfunction (Stage I).  7. Normal right ventricular size and function.  8. Normal pericardium with small pericardial effusion.  There is some buckling of the right atrium in diastole, but  no right ventricular collapse. The inferior vena cava  collapses with respiration. No evidence of tamponade  physiology.    < end of copied text >  < from: CT Chest No Cont (01.10.19 @ 23:28) >  Small pleural and small pericardial effusions are new from 2017.   Increased number of nonenlarged mediastinal lymph nodes are slightly   increased in size and number compared to the prior study.    2 mm left apical nodules unchanged.    < end of copied text >      Assessment and plan  ---------------------------  will increase bp meds  ct abdomen and pelvis  ? onc eval

## 2019-01-12 NOTE — PROVIDER CONTACT NOTE (OTHER) - SITUATION
Patient's heart rate dropped to 39/mt around 4am for few seconds,Patient asymptomatic,denies any distress,no diziness

## 2019-01-13 LAB
ANION GAP SERPL CALC-SCNC: 9 MMOL/L — SIGNIFICANT CHANGE UP (ref 5–17)
BUN SERPL-MCNC: 27 MG/DL — HIGH (ref 7–23)
CALCIUM SERPL-MCNC: 9 MG/DL — SIGNIFICANT CHANGE UP (ref 8.4–10.5)
CHLORIDE SERPL-SCNC: 105 MMOL/L — SIGNIFICANT CHANGE UP (ref 96–108)
CO2 SERPL-SCNC: 24 MMOL/L — SIGNIFICANT CHANGE UP (ref 22–31)
CREAT SERPL-MCNC: 1.55 MG/DL — HIGH (ref 0.5–1.3)
GLUCOSE SERPL-MCNC: 92 MG/DL — SIGNIFICANT CHANGE UP (ref 70–99)
POTASSIUM SERPL-MCNC: 4.3 MMOL/L — SIGNIFICANT CHANGE UP (ref 3.5–5.3)
POTASSIUM SERPL-SCNC: 4.3 MMOL/L — SIGNIFICANT CHANGE UP (ref 3.5–5.3)
SODIUM SERPL-SCNC: 138 MMOL/L — SIGNIFICANT CHANGE UP (ref 135–145)

## 2019-01-13 RX ORDER — AMLODIPINE BESYLATE 2.5 MG/1
10 TABLET ORAL DAILY
Qty: 0 | Refills: 0 | Status: DISCONTINUED | OUTPATIENT
Start: 2019-01-13 | End: 2019-01-13

## 2019-01-13 RX ORDER — AMLODIPINE BESYLATE 2.5 MG/1
7.5 TABLET ORAL DAILY
Qty: 0 | Refills: 0 | Status: DISCONTINUED | OUTPATIENT
Start: 2019-01-13 | End: 2019-01-14

## 2019-01-13 RX ADMIN — AMLODIPINE BESYLATE 5 MILLIGRAM(S): 2.5 TABLET ORAL at 05:23

## 2019-01-13 RX ADMIN — Medication 75 MILLIGRAM(S): at 11:39

## 2019-01-13 RX ADMIN — HEPARIN SODIUM 5000 UNIT(S): 5000 INJECTION INTRAVENOUS; SUBCUTANEOUS at 18:17

## 2019-01-13 RX ADMIN — Medication 1 PATCH: at 11:39

## 2019-01-13 RX ADMIN — Medication 75 MILLIGRAM(S): at 21:10

## 2019-01-13 RX ADMIN — Medication 1 PATCH: at 19:11

## 2019-01-13 RX ADMIN — HEPARIN SODIUM 5000 UNIT(S): 5000 INJECTION INTRAVENOUS; SUBCUTANEOUS at 05:23

## 2019-01-13 RX ADMIN — Medication 81 MILLIGRAM(S): at 11:39

## 2019-01-13 RX ADMIN — Medication 1 PATCH: at 07:30

## 2019-01-13 RX ADMIN — ATORVASTATIN CALCIUM 20 MILLIGRAM(S): 80 TABLET, FILM COATED ORAL at 21:10

## 2019-01-13 NOTE — PROGRESS NOTE ADULT - SUBJECTIVE AND OBJECTIVE BOX
CARDIOLOGY     PROGRESS  NOTE   ________________________________________________    CHIEF COMPLAINT:Patient is a 58y old  Male who presents with a chief complaint of sob (12 Jan 2019 13:26)  doing better.  	  REVIEW OF SYSTEMS:  CONSTITUTIONAL: No fever, weight loss, or fatigue  EYES: No eye pain, visual disturbances, or discharge  ENT:  No difficulty hearing, tinnitus, vertigo; No sinus or throat pain  NECK: No pain or stiffness  RESPIRATORY: No cough, wheezing, chills or hemoptysis; No Shortness of Breath  CARDIOVASCULAR: No chest pain, palpitations, passing out, dizziness, or leg swelling  GASTROINTESTINAL: No abdominal or epigastric pain. No nausea, vomiting, or hematemesis; No diarrhea or constipation. No melena or hematochezia.  GENITOURINARY: No dysuria, frequency, hematuria, or incontinence  NEUROLOGICAL: No headaches, memory loss, loss of strength, numbness, or tremors  SKIN: No itching, burning, rashes, or lesions   LYMPH Nodes: No enlarged glands  ENDOCRINE: No heat or cold intolerance; No hair loss  MUSCULOSKELETAL: No joint pain or swelling; No muscle, back, or extremity pain  PSYCHIATRIC: No depression, anxiety, mood swings, or difficulty sleeping  HEME/LYMPH: No easy bruising, or bleeding gums  ALLERGY AND IMMUNOLOGIC: No hives or eczema	    [ ] All others negative	  [ ] Unable to obtain    PHYSICAL EXAM:  T(C): 36.7 (01-13-19 @ 04:32), Max: 36.9 (01-12-19 @ 11:15)  HR: 57 (01-13-19 @ 05:15) (7 - 67)  BP: 167/90 (01-13-19 @ 04:32) (154/84 - 167/90)  RR: 18 (01-13-19 @ 04:32) (18 - 18)  SpO2: 99% (01-13-19 @ 04:32) (95% - 99%)  Wt(kg): --  I&O's Summary    12 Jan 2019 07:01  -  13 Jan 2019 07:00  --------------------------------------------------------  IN: 1140 mL / OUT: 0 mL / NET: 1140 mL        Appearance: Normal	  HEENT:   Normal oral mucosa, PERRL, EOMI	  Lymphatic: No lymphadenopathy  Cardiovascular: Normal S1 S2, No JVD, + murmurs, No edema  Respiratory: Lungs clear to auscultation	  Psychiatry: A & O x 3, Mood & affect appropriate  Gastrointestinal:  Soft, Non-tender, + BS	  Skin: No rashes, No ecchymoses, No cyanosis	  Neurologic: Non-focal  Extremities: Normal range of motion, No clubbing, cyanosis or edema  Vascular: Peripheral pulses palpable 2+ bilaterally    MEDICATIONS  (STANDING):  amLODIPine   Tablet 5 milliGRAM(s) Oral daily  aspirin enteric coated 81 milliGRAM(s) Oral daily  atorvastatin 20 milliGRAM(s) Oral at bedtime  heparin  Injectable 5000 Unit(s) SubCutaneous every 12 hours  metoprolol tartrate 75 milliGRAM(s) Oral two times a day  nicotine - 21 mG/24Hr(s) Patch 1 patch Transdermal daily      TELEMETRY: 	    ECG:  	  RADIOLOGY:  OTHER: 	  	  LABS:	 	    CARDIAC MARKERS:                                12.5   9.37  )-----------( 249      ( 12 Jan 2019 07:14 )             38.5     01-13    138  |  105  |  27<H>  ----------------------------<  92  4.3   |  24  |  1.55<H>    Ca    9.0      13 Jan 2019 05:52      proBNP: Serum Pro-Brain Natriuretic Peptide: 4067 pg/mL (01-10 @ 11:13)    Lipid Profile: Cholesterol 236    HDL 46      HgA1c:   TSH: Thyroid Stimulating Hormone, Serum: 1.81 uIU/mL (01-11 @ 08:30)    < from: CT Abdomen and Pelvis No Cont (01.12.19 @ 16:11) >  INTERPRETATION:  no urgent or emergent findings  f.u final report    < end of copied text >        Assessment and plan  ---------------------------  doing better  bp still elevated  increase norvasc  renal doppler

## 2019-01-13 NOTE — PROGRESS NOTE ADULT - SUBJECTIVE AND OBJECTIVE BOX
elevated bp    REVIEW OF SYSTEMS:  GEN: no fever,    no chills  RESP: no SOB,   no cough  CVS: no chest pain,   no palpitations  GI: no abdominal pain,   no nausea,   no vomiting,   no constipation,   no diarrhea  : no dysuria,   no frequency  NEURO: no headache,   no dizziness  PSYCH: no depression,   not anxious  Derm : no rash    MEDICATIONS  (STANDING):  amLODIPine   Tablet 10 milliGRAM(s) Oral daily  aspirin enteric coated 81 milliGRAM(s) Oral daily  atorvastatin 20 milliGRAM(s) Oral at bedtime  heparin  Injectable 5000 Unit(s) SubCutaneous every 12 hours  metoprolol tartrate 75 milliGRAM(s) Oral two times a day  nicotine - 21 mG/24Hr(s) Patch 1 patch Transdermal daily    MEDICATIONS  (PRN):      Vital Signs Last 24 Hrs  T(C): 36.7 (13 Jan 2019 04:32), Max: 36.9 (12 Jan 2019 11:15)  T(F): 98.1 (13 Jan 2019 04:32), Max: 98.5 (12 Jan 2019 11:15)  HR: 57 (13 Jan 2019 05:15) (7 - 67)  BP: 167/90 (13 Jan 2019 04:32) (154/84 - 167/90)  BP(mean): --  RR: 18 (13 Jan 2019 04:32) (18 - 18)  SpO2: 99% (13 Jan 2019 04:32) (95% - 99%)  CAPILLARY BLOOD GLUCOSE        I&O's Summary    12 Jan 2019 07:01  -  13 Jan 2019 07:00  --------------------------------------------------------  IN: 1140 mL / OUT: 0 mL / NET: 1140 mL        PHYSICAL EXAM:  HEAD:  Atraumatic, Normocephalic  NECK: Supple, No   JVD  CHEST/LUNG:   no     rales,     no,    rhonchi  HEART: Regular rate and rhythm;         murmur  ABDOMEN: Soft, Nontender, ;   EXTREMITIES:     no   edema  NEUROLOGY:  alert    LABS:                        12.5   9.37  )-----------( 249      ( 12 Jan 2019 07:14 )             38.5     01-13    138  |  105  |  27<H>  ----------------------------<  92  4.3   |  24  |  1.55<H>    Ca    9.0      13 Jan 2019 05:52                      Thyroid Stimulating Hormone, Serum: 1.81 uIU/mL (01-11 @ 08:30)          Consultant(s) Notes Reviewed:      Care Discussed with Consultants/Other Providers:

## 2019-01-13 NOTE — PROGRESS NOTE ADULT - ASSESSMENT
pt   with  h/o  htn.  not  on  any  meds    admitted  with  worsening  sob  for  past  few  weeks  from  acute  diastolic  chf/  elevated  bnp, over 4.000  tele,  nsr   iv lasix/ asa/ statin/ BB  dvt ppx   card   echo, normal  ef  cath ,  no obstruction   from: CT Abdomen and Pelvis No Cont (01.12.19 @ 16:11) >  INTERPRETATION:  no urgent or emergent findings  f.u final repor  < end of copied text >    ct  chest  noted.  needs  f/p  ct in few  months    seen by pulm/  ct abdomen, prelim, negative    d/c in am,  when  bp  ha s improved        < from: Transthoracic Echocardiogram (01.11.19 @ 08:38) >  1. Mitral annular calcification. Thickened mitral valve  leaflets. Mild-moderate mitral regurgitation.  2. Calcified aortic valve with decreased opening. Peak  transaortic valve gradient equals 29 mm Hg, mean  transaortic valve gradient equals 14 mm Hg, estimated  aortic valve area equals 1.5 sqcm (by continuity equation),  aortic valve velocity time integral equals 50 cm,  consistent with moderate aortic stenosis. Mild, eccentric  aortic regurgitation.  3. Moderate left atrial enlargement.  4. Mild to moderate concentric left ventricular  hypertrophy.  5. Normal left ventricular systolic function. No segmental  wall motion abnormalities.  6. Mild diastolic dysfunction (Stage I).  7. Normal right ventricular size and function.  8. Normal pericardium with small pericardial effusion.  There is some buckling of the right atrium in diastole, but  no right ventricular collapse. The inferior vena cava  collapses with respiration. No evidence of tamponade  physiology.    < end of copied text >       < from: CT Chest No Cont (01.10.19 @ 23:28) >    IMPRESSION:   Small pleural and small pericardial effusions are new from 2017.   Increased number of nonenlarged mediastinal lymph nodes are slightly   increased in size and number compared to the prior study.  2 mm left apical nodules unchanged.  < end of copied text >

## 2019-01-14 LAB
ANION GAP SERPL CALC-SCNC: 12 MMOL/L — SIGNIFICANT CHANGE UP (ref 5–17)
BUN SERPL-MCNC: 27 MG/DL — HIGH (ref 7–23)
CALCIUM SERPL-MCNC: 9.1 MG/DL — SIGNIFICANT CHANGE UP (ref 8.4–10.5)
CHLORIDE SERPL-SCNC: 103 MMOL/L — SIGNIFICANT CHANGE UP (ref 96–108)
CO2 SERPL-SCNC: 23 MMOL/L — SIGNIFICANT CHANGE UP (ref 22–31)
CREAT SERPL-MCNC: 1.47 MG/DL — HIGH (ref 0.5–1.3)
GLUCOSE SERPL-MCNC: 89 MG/DL — SIGNIFICANT CHANGE UP (ref 70–99)
POTASSIUM SERPL-MCNC: 4.3 MMOL/L — SIGNIFICANT CHANGE UP (ref 3.5–5.3)
POTASSIUM SERPL-SCNC: 4.3 MMOL/L — SIGNIFICANT CHANGE UP (ref 3.5–5.3)
SODIUM SERPL-SCNC: 138 MMOL/L — SIGNIFICANT CHANGE UP (ref 135–145)

## 2019-01-14 PROCEDURE — 75557 CARDIAC MRI FOR MORPH: CPT | Mod: 26

## 2019-01-14 PROCEDURE — 93975 VASCULAR STUDY: CPT | Mod: 26

## 2019-01-14 RX ORDER — AMLODIPINE BESYLATE 2.5 MG/1
2.5 TABLET ORAL ONCE
Qty: 0 | Refills: 0 | Status: COMPLETED | OUTPATIENT
Start: 2019-01-14 | End: 2019-01-14

## 2019-01-14 RX ORDER — AMLODIPINE BESYLATE 2.5 MG/1
10 TABLET ORAL DAILY
Qty: 0 | Refills: 0 | Status: DISCONTINUED | OUTPATIENT
Start: 2019-01-14 | End: 2019-01-15

## 2019-01-14 RX ADMIN — Medication 1 PATCH: at 07:08

## 2019-01-14 RX ADMIN — HEPARIN SODIUM 5000 UNIT(S): 5000 INJECTION INTRAVENOUS; SUBCUTANEOUS at 18:37

## 2019-01-14 RX ADMIN — AMLODIPINE BESYLATE 2.5 MILLIGRAM(S): 2.5 TABLET ORAL at 09:46

## 2019-01-14 RX ADMIN — Medication 1 PATCH: at 10:00

## 2019-01-14 RX ADMIN — Medication 81 MILLIGRAM(S): at 13:14

## 2019-01-14 RX ADMIN — Medication 1 PATCH: at 13:13

## 2019-01-14 RX ADMIN — HEPARIN SODIUM 5000 UNIT(S): 5000 INJECTION INTRAVENOUS; SUBCUTANEOUS at 05:23

## 2019-01-14 RX ADMIN — Medication 75 MILLIGRAM(S): at 18:37

## 2019-01-14 RX ADMIN — AMLODIPINE BESYLATE 7.5 MILLIGRAM(S): 2.5 TABLET ORAL at 05:19

## 2019-01-14 RX ADMIN — ATORVASTATIN CALCIUM 20 MILLIGRAM(S): 80 TABLET, FILM COATED ORAL at 22:18

## 2019-01-14 NOTE — PROGRESS NOTE ADULT - SUBJECTIVE AND OBJECTIVE BOX
no  complaints    REVIEW OF SYSTEMS:  GEN: no fever,    no chills  RESP: no SOB,   no cough  CVS: no chest pain,   no palpitations  GI: no abdominal pain,   no nausea,   no vomiting,   no constipation,   no diarrhea  : no dysuria,   no frequency  NEURO: no headache,   no dizziness  PSYCH: no depression,   not anxious  Derm : no rash    MEDICATIONS  (STANDING):  amLODIPine   Tablet 10 milliGRAM(s) Oral daily  aspirin enteric coated 81 milliGRAM(s) Oral daily  atorvastatin 20 milliGRAM(s) Oral at bedtime  heparin  Injectable 5000 Unit(s) SubCutaneous every 12 hours  metoprolol tartrate 75 milliGRAM(s) Oral two times a day  nicotine - 21 mG/24Hr(s) Patch 1 patch Transdermal daily    MEDICATIONS  (PRN):      Vital Signs Last 24 Hrs  T(C): 36.7 (14 Jan 2019 04:15), Max: 36.9 (13 Jan 2019 11:25)  T(F): 98 (14 Jan 2019 04:15), Max: 98.5 (13 Jan 2019 11:25)  HR: 52 (14 Jan 2019 05:15) (52 - 76)  BP: 156/87 (14 Jan 2019 04:15) (134/77 - 171/86)  BP(mean): --  RR: 18 (14 Jan 2019 04:15) (17 - 18)  SpO2: 97% (14 Jan 2019 04:15) (95% - 97%)  CAPILLARY BLOOD GLUCOSE        I&O's Summary    13 Jan 2019 07:01  -  14 Jan 2019 07:00  --------------------------------------------------------  IN: 1500 mL / OUT: 0 mL / NET: 1500 mL        PHYSICAL EXAM:  HEAD:  Atraumatic, Normocephalic  NECK: Supple, No   JVD  CHEST/LUNG:   no     rales,     no,    rhonchi  HEART: Regular rate and rhythm;         murmur  ABDOMEN: Soft, Nontender, ;   EXTREMITIES:     no   edema  NEUROLOGY:  alert    LABS:    01-14    138  |  103  |  27<H>  ----------------------------<  89  4.3   |  23  |  1.47<H>    Ca    9.1      14 Jan 2019 06:20                      Thyroid Stimulating Hormone, Serum: 1.81 uIU/mL (01-11 @ 08:30)          Consultant(s) Notes Reviewed:      Care Discussed with Consultants/Other Providers:

## 2019-01-14 NOTE — PROGRESS NOTE ADULT - SUBJECTIVE AND OBJECTIVE BOX
CARDIOLOGY     PROGRESS  NOTE   ________________________________________________    CHIEF COMPLAINT:Patient is a 58y old  Male who presents with a chief complaint of sob (13 Jan 2019 09:14)  still with barcenas.  	  REVIEW OF SYSTEMS:  CONSTITUTIONAL: No fever, weight loss, or fatigue  EYES: No eye pain, visual disturbances, or discharge  ENT:  No difficulty hearing, tinnitus, vertigo; No sinus or throat pain  NECK: No pain or stiffness  RESPIRATORY: No cough, wheezing, chills or hemoptysis; No Shortness of Breath  CARDIOVASCULAR: No chest pain, palpitations, passing out, dizziness, or leg swelling  GASTROINTESTINAL: No abdominal or epigastric pain. No nausea, vomiting, or hematemesis; No diarrhea or constipation. No melena or hematochezia.  GENITOURINARY: No dysuria, frequency, hematuria, or incontinence  NEUROLOGICAL: No headaches, memory loss, loss of strength, numbness, or tremors  SKIN: No itching, burning, rashes, or lesions   LYMPH Nodes: No enlarged glands  ENDOCRINE: No heat or cold intolerance; No hair loss  MUSCULOSKELETAL: No joint pain or swelling; No muscle, back, or extremity pain  PSYCHIATRIC: No depression, anxiety, mood swings, or difficulty sleeping  HEME/LYMPH: No easy bruising, or bleeding gums  ALLERGY AND IMMUNOLOGIC: No hives or eczema	    [ ] All others negative	  [ ] Unable to obtain    PHYSICAL EXAM:  T(C): 36.7 (01-14-19 @ 04:15), Max: 36.9 (01-13-19 @ 11:25)  HR: 52 (01-14-19 @ 05:15) (52 - 76)  BP: 156/87 (01-14-19 @ 04:15) (134/77 - 171/86)  RR: 18 (01-14-19 @ 04:15) (17 - 18)  SpO2: 97% (01-14-19 @ 04:15) (95% - 97%)  Wt(kg): --  I&O's Summary    13 Jan 2019 07:01  -  14 Jan 2019 07:00  --------------------------------------------------------  IN: 1500 mL / OUT: 0 mL / NET: 1500 mL        Appearance: Normal	  HEENT:   Normal oral mucosa, PERRL, EOMI	  Lymphatic: No lymphadenopathy  Cardiovascular: Normal S1 S2, No JVD, + murmurs, No edema  Respiratory: Lungs clear to auscultation	  Psychiatry: A & O x 3, Mood & affect appropriate  Gastrointestinal:  Soft, Non-tender, + BS	  Skin: No rashes, No ecchymoses, No cyanosis	  Neurologic: Non-focal  Extremities: Normal range of motion, No clubbing, cyanosis or edema  Vascular: Peripheral pulses palpable 2+ bilaterally    MEDICATIONS  (STANDING):  amLODIPine   Tablet 7.5 milliGRAM(s) Oral daily  aspirin enteric coated 81 milliGRAM(s) Oral daily  atorvastatin 20 milliGRAM(s) Oral at bedtime  heparin  Injectable 5000 Unit(s) SubCutaneous every 12 hours  metoprolol tartrate 75 milliGRAM(s) Oral two times a day  nicotine - 21 mG/24Hr(s) Patch 1 patch Transdermal daily      TELEMETRY: 	    ECG:  	  RADIOLOGY:  OTHER: 	  	  LABS:	 	    CARDIAC MARKERS:    < from: CT Abdomen and Pelvis No Cont (01.12.19 @ 16:11) >  No intra-abdominal malignancy on this noncontrast examination.    < end of copied text >          01-14    138  |  103  |  27<H>  ----------------------------<  89  4.3   |  23  |  1.47<H>    Ca    9.1      14 Jan 2019 06:20      proBNP: Serum Pro-Brain Natriuretic Peptide: 4067 pg/mL (01-10 @ 11:13)    Lipid Profile: Cholesterol 236    HDL 46      HgA1c:   TSH: Thyroid Stimulating Hormone, Serum: 1.81 uIU/mL (01-11 @ 08:30)          Assessment and plan  ---------------------------  cardiac mri  bp is better controlled  increase norvasc to 10 mg daily

## 2019-01-14 NOTE — PROGRESS NOTE ADULT - ASSESSMENT
pt   with  h/o  htn.  not  on  any  meds    admitted  with  worsening  sob  for  past  few  weeks  from  acute  diastolic  chf/  elevated  bnp, over 4.000  tele,  nsr   iv lasix/ asa/ statin/ BB  dvt ppx   card   echo, normal  ef  cath ,  no obstruction  mri heart, pending     f  ct  chest  noted.  needs  f/p  ct in few  months    seen by pulm/  ct abdomen, prelim, negative    d/c in am,  when  bp  ha s improved        < from: Transthoracic Echocardiogram (01.11.19 @ 08:38) >  1. Mitral annular calcification. Thickened mitral valve  leaflets. Mild-moderate mitral regurgitation.  2. Calcified aortic valve with decreased opening. Peak  transaortic valve gradient equals 29 mm Hg, mean  transaortic valve gradient equals 14 mm Hg, estimated  aortic valve area equals 1.5 sqcm (by continuity equation),  aortic valve velocity time integral equals 50 cm,  consistent with moderate aortic stenosis. Mild, eccentric  aortic regurgitation.  3. Moderate left atrial enlargement.  4. Mild to moderate concentric left ventricular  hypertrophy.  5. Normal left ventricular systolic function. No segmental  wall motion abnormalities.  6. Mild diastolic dysfunction (Stage I).  7. Normal right ventricular size and function.  8. Normal pericardium with small pericardial effusion.  There is some buckling of the right atrium in diastole, but  no right ventricular collapse. The inferior vena cava  collapses with respiration. No evidence of tamponade  physiology.    < end of copied text >       < from: CT Chest No Cont (01.10.19 @ 23:28) >    IMPRESSION:   Small pleural and small pericardial effusions are new from 2017.   Increased number of nonenlarged mediastinal lymph nodes are slightly   increased in size and number compared to the prior study.  2 mm left apical nodules unchanged.  < end of copied text >

## 2019-01-15 ENCOUNTER — TRANSCRIPTION ENCOUNTER (OUTPATIENT)
Age: 59
End: 2019-01-15

## 2019-01-15 VITALS
TEMPERATURE: 98 F | SYSTOLIC BLOOD PRESSURE: 142 MMHG | DIASTOLIC BLOOD PRESSURE: 86 MMHG | HEART RATE: 63 BPM | OXYGEN SATURATION: 100 % | RESPIRATION RATE: 18 BRPM

## 2019-01-15 LAB
ANION GAP SERPL CALC-SCNC: 13 MMOL/L — SIGNIFICANT CHANGE UP (ref 5–17)
BUN SERPL-MCNC: 31 MG/DL — HIGH (ref 7–23)
CALCIUM SERPL-MCNC: 8.6 MG/DL — SIGNIFICANT CHANGE UP (ref 8.4–10.5)
CHLORIDE SERPL-SCNC: 104 MMOL/L — SIGNIFICANT CHANGE UP (ref 96–108)
CO2 SERPL-SCNC: 23 MMOL/L — SIGNIFICANT CHANGE UP (ref 22–31)
CREAT SERPL-MCNC: 1.52 MG/DL — HIGH (ref 0.5–1.3)
ERYTHROCYTE [SEDIMENTATION RATE] IN BLOOD: 16 MM/HR — SIGNIFICANT CHANGE UP (ref 0–20)
GLUCOSE SERPL-MCNC: 92 MG/DL — SIGNIFICANT CHANGE UP (ref 70–99)
HCT VFR BLD CALC: 38.8 % — LOW (ref 39–50)
HGB BLD-MCNC: 12.6 G/DL — LOW (ref 13–17)
MCHC RBC-ENTMCNC: 29.7 PG — SIGNIFICANT CHANGE UP (ref 27–34)
MCHC RBC-ENTMCNC: 32.5 GM/DL — SIGNIFICANT CHANGE UP (ref 32–36)
MCV RBC AUTO: 91.5 FL — SIGNIFICANT CHANGE UP (ref 80–100)
PLATELET # BLD AUTO: 227 K/UL — SIGNIFICANT CHANGE UP (ref 150–400)
POTASSIUM SERPL-MCNC: 4.3 MMOL/L — SIGNIFICANT CHANGE UP (ref 3.5–5.3)
POTASSIUM SERPL-SCNC: 4.3 MMOL/L — SIGNIFICANT CHANGE UP (ref 3.5–5.3)
RBC # BLD: 4.24 M/UL — SIGNIFICANT CHANGE UP (ref 4.2–5.8)
RBC # FLD: 13.6 % — SIGNIFICANT CHANGE UP (ref 10.3–14.5)
SODIUM SERPL-SCNC: 140 MMOL/L — SIGNIFICANT CHANGE UP (ref 135–145)
WBC # BLD: 8.86 K/UL — SIGNIFICANT CHANGE UP (ref 3.8–10.5)
WBC # FLD AUTO: 8.86 K/UL — SIGNIFICANT CHANGE UP (ref 3.8–10.5)

## 2019-01-15 PROCEDURE — 93005 ELECTROCARDIOGRAM TRACING: CPT

## 2019-01-15 PROCEDURE — 99152 MOD SED SAME PHYS/QHP 5/>YRS: CPT

## 2019-01-15 PROCEDURE — 85652 RBC SED RATE AUTOMATED: CPT

## 2019-01-15 PROCEDURE — 82550 ASSAY OF CK (CPK): CPT

## 2019-01-15 PROCEDURE — 85027 COMPLETE CBC AUTOMATED: CPT

## 2019-01-15 PROCEDURE — 85730 THROMBOPLASTIN TIME PARTIAL: CPT

## 2019-01-15 PROCEDURE — 71250 CT THORAX DX C-: CPT

## 2019-01-15 PROCEDURE — 80053 COMPREHEN METABOLIC PANEL: CPT

## 2019-01-15 PROCEDURE — 93975 VASCULAR STUDY: CPT

## 2019-01-15 PROCEDURE — 85610 PROTHROMBIN TIME: CPT

## 2019-01-15 PROCEDURE — 83880 ASSAY OF NATRIURETIC PEPTIDE: CPT

## 2019-01-15 PROCEDURE — 80048 BASIC METABOLIC PNL TOTAL CA: CPT

## 2019-01-15 PROCEDURE — 75557 CARDIAC MRI FOR MORPH: CPT

## 2019-01-15 PROCEDURE — 74176 CT ABD & PELVIS W/O CONTRAST: CPT

## 2019-01-15 PROCEDURE — 84484 ASSAY OF TROPONIN QUANT: CPT

## 2019-01-15 PROCEDURE — 71046 X-RAY EXAM CHEST 2 VIEWS: CPT

## 2019-01-15 PROCEDURE — 82553 CREATINE MB FRACTION: CPT

## 2019-01-15 PROCEDURE — 99285 EMERGENCY DEPT VISIT HI MDM: CPT

## 2019-01-15 PROCEDURE — A9585: CPT

## 2019-01-15 PROCEDURE — C1769: CPT

## 2019-01-15 PROCEDURE — 80061 LIPID PANEL: CPT

## 2019-01-15 PROCEDURE — 84443 ASSAY THYROID STIM HORMONE: CPT

## 2019-01-15 PROCEDURE — C1894: CPT

## 2019-01-15 PROCEDURE — 93458 L HRT ARTERY/VENTRICLE ANGIO: CPT

## 2019-01-15 PROCEDURE — C1887: CPT

## 2019-01-15 PROCEDURE — 93306 TTE W/DOPPLER COMPLETE: CPT

## 2019-01-15 RX ORDER — METOPROLOL TARTRATE 50 MG
2 TABLET ORAL
Qty: 120 | Refills: 0
Start: 2019-01-15 | End: 2019-02-13

## 2019-01-15 RX ORDER — AMLODIPINE BESYLATE 2.5 MG/1
1 TABLET ORAL
Qty: 30 | Refills: 0
Start: 2019-01-15 | End: 2019-02-13

## 2019-01-15 RX ORDER — ATORVASTATIN CALCIUM 80 MG/1
1 TABLET, FILM COATED ORAL
Qty: 30 | Refills: 0
Start: 2019-01-15 | End: 2019-02-13

## 2019-01-15 RX ORDER — ASPIRIN/CALCIUM CARB/MAGNESIUM 324 MG
1 TABLET ORAL
Qty: 30 | Refills: 0
Start: 2019-01-15 | End: 2019-02-13

## 2019-01-15 RX ADMIN — Medication 1 PATCH: at 10:06

## 2019-01-15 RX ADMIN — Medication 75 MILLIGRAM(S): at 05:45

## 2019-01-15 RX ADMIN — HEPARIN SODIUM 5000 UNIT(S): 5000 INJECTION INTRAVENOUS; SUBCUTANEOUS at 05:45

## 2019-01-15 RX ADMIN — Medication 1 PATCH: at 14:55

## 2019-01-15 RX ADMIN — Medication 1 PATCH: at 14:56

## 2019-01-15 RX ADMIN — Medication 75 MILLIGRAM(S): at 18:36

## 2019-01-15 RX ADMIN — Medication 1 PATCH: at 07:00

## 2019-01-15 RX ADMIN — AMLODIPINE BESYLATE 10 MILLIGRAM(S): 2.5 TABLET ORAL at 05:45

## 2019-01-15 RX ADMIN — HEPARIN SODIUM 5000 UNIT(S): 5000 INJECTION INTRAVENOUS; SUBCUTANEOUS at 18:33

## 2019-01-15 RX ADMIN — Medication 81 MILLIGRAM(S): at 11:24

## 2019-01-15 NOTE — DISCHARGE NOTE ADULT - ADDITIONAL INSTRUCTIONS
Follow up with your primary care physician within 1-2 weeks of discharge.   Follow up with your cardiologist within 1-2 weeks of discharge.

## 2019-01-15 NOTE — DISCHARGE NOTE ADULT - NS AS ACTIVITY OBS
Walking-Outdoors allowed/Bathing allowed/Showering allowed/Walking-Indoors allowed/Resume activity as tolerated./Stairs allowed

## 2019-01-15 NOTE — DISCHARGE NOTE ADULT - PATIENT PORTAL LINK FT
You can access the [employer] Patient Portal, offered by [facility], by registering with the following website: [email or web address]

## 2019-01-15 NOTE — DISCHARGE NOTE ADULT - HOSPITAL COURSE
59 y/o M with PMHx HTN, former smoker presents with SOB w/ exertion x few weeks. Pt was given Lasix for presumed acute on chronic diastolic CHF. A Dx cath was performed which showed a 30% stenosis of the mRCA. Recommending medical management. A chest CT was performed and showed abnormal results with Small pleural and small pericardial effusions are new from 2017.   Increased number of nonenlarged mediastinal lymph nodes are slightly   increased in size and number compared to the prior study.    2 mm left apical nodules unchanged.    A CT A/P was done to r/o malignancy and showed no malignant sources.  A cardiac MRI was performed and showed aortic stenosis.   Pt otherwise medically stable for discharge home. 57 y/o M with PMHx HTN, former smoker presents with SOB w/ exertion x few weeks. Pt was given Lasix for presumed acute on chronic diastolic CHF. A Dx cath was performed which showed a 30% stenosis of the mRCA. Recommending medical management. A chest CT was performed and showed abnormal results with Small pleural and small pericardial effusions are new from 2017.   Increased number of nonenlarged mediastinal lymph nodes are slightly   increased in size and number compared to the prior study.    2 mm left apical nodules unchanged.    A CT A/P was done to r/o malignancy and showed no malignant sources.  A cardiac MRI was performed to assess aortic stenosis. Dr. Lopez cleared for discharge. Pt otherwise medically stable for discharge home.

## 2019-01-15 NOTE — DISCHARGE NOTE ADULT - INSTRUCTIONS
No fluid restrictions. Resume low salt and low fat diet. Avoid added salt, frozen dinners, foods fried in oil, salted cured meats including deli meat and cheese, and canned soups or broths.

## 2019-01-15 NOTE — PROGRESS NOTE ADULT - SUBJECTIVE AND OBJECTIVE BOX
CARDIOLOGY     PROGRESS  NOTE   ________________________________________________    CHIEF COMPLAINT:Patient is a 58y old  Male who presents with a chief complaint of sob (14 Jan 2019 08:19)  no complain.  	  REVIEW OF SYSTEMS:  CONSTITUTIONAL: No fever, weight loss, or fatigue  EYES: No eye pain, visual disturbances, or discharge  ENT:  No difficulty hearing, tinnitus, vertigo; No sinus or throat pain  NECK: No pain or stiffness  RESPIRATORY: No cough, wheezing, chills or hemoptysis; No Shortness of Breath  CARDIOVASCULAR: No chest pain, palpitations, passing out, dizziness, or leg swelling  GASTROINTESTINAL: No abdominal or epigastric pain. No nausea, vomiting, or hematemesis; No diarrhea or constipation. No melena or hematochezia.  GENITOURINARY: No dysuria, frequency, hematuria, or incontinence  NEUROLOGICAL: No headaches, memory loss, loss of strength, numbness, or tremors  SKIN: No itching, burning, rashes, or lesions   LYMPH Nodes: No enlarged glands  ENDOCRINE: No heat or cold intolerance; No hair loss  MUSCULOSKELETAL: No joint pain or swelling; No muscle, back, or extremity pain  PSYCHIATRIC: No depression, anxiety, mood swings, or difficulty sleeping  HEME/LYMPH: No easy bruising, or bleeding gums  ALLERGY AND IMMUNOLOGIC: No hives or eczema	    [ ] All others negative	  [ ] Unable to obtain    PHYSICAL EXAM:  T(C): 36.7 (01-15-19 @ 04:29), Max: 36.8 (01-14-19 @ 14:02)  HR: 68 (01-15-19 @ 04:29) (57 - 80)  BP: 149/88 (01-15-19 @ 04:29) (149/88 - 159/87)  RR: 18 (01-15-19 @ 04:29) (18 - 18)  SpO2: 94% (01-15-19 @ 04:29) (94% - 99%)  Wt(kg): --  I&O's Summary    14 Jan 2019 07:01  -  15 Robbin 2019 07:00  --------------------------------------------------------  IN: 1140 mL / OUT: 0 mL / NET: 1140 mL        Appearance: Normal	  HEENT:   Normal oral mucosa, PERRL, EOMI	  Lymphatic: No lymphadenopathy  Cardiovascular: Normal S1 S2, No JVD, No murmurs, No edema  Respiratory: Lungs clear to auscultation	  Psychiatry: A & O x 3, Mood & affect appropriate  Gastrointestinal:  Soft, Non-tender, + BS	  Skin: No rashes, No ecchymoses, No cyanosis	  Neurologic: Non-focal  Extremities: Normal range of motion, No clubbing, cyanosis or edema  Vascular: Peripheral pulses palpable 2+ bilaterally    MEDICATIONS  (STANDING):  amLODIPine   Tablet 10 milliGRAM(s) Oral daily  aspirin enteric coated 81 milliGRAM(s) Oral daily  atorvastatin 20 milliGRAM(s) Oral at bedtime  heparin  Injectable 5000 Unit(s) SubCutaneous every 12 hours  metoprolol tartrate 75 milliGRAM(s) Oral two times a day  nicotine - 21 mG/24Hr(s) Patch 1 patch Transdermal daily      TELEMETRY: 	    ECG:  	  RADIOLOGY:  OTHER: 	  	  LABS:	 	    CARDIAC MARKERS:    < from: MR Cardiac No Cont (01.14.19 @ 12:59) >  1.  Concentric thickening of the left ventricular myocardium is likely   extending to the underlying hypertension and reported history of aortic   valve stenosis.  2.  Subtle foci of late gadolinium enhancement along the anterior and   anteroseptal wall at the mid cavity. This could be secondary to altered   mechanics of underlying aortic stenosis or alternatively prior myocardial   infarction, though the latter is thought to be less likely.  3.  Aortic stenosis, not quantified.  4.  Phase contrast images were not obtained to quantify or evaluate   aortic stenosis. If these are desired, the patient and return for   additional phase-contrast imaging.  5.  The findings were discussed with MERRY Zelaya.    < end of copied text >          01-15    140  |  104  |  31<H>  ----------------------------<  92  4.3   |  23  |  1.52<H>    Ca    8.6      15 Robbin 2019 05:52      proBNP: Serum Pro-Brain Natriuretic Peptide: 4067 pg/mL (01-10 @ 11:13)    Lipid Profile: Cholesterol 236    HDL 46      HgA1c:   TSH: Thyroid Stimulating Hormone, Serum: 1.81 uIU/mL (01-11 @ 08:30)  < from: US Duplex Kidneys (01.14.19 @ 11:39) >  No evidence of a significant renal artery stenosis.    Mild increase in the echogenicity of the renal parenchyma. Please   correlate with creatinine level.    < end of copied text >          Assessment and plan  ---------------------------  doing well  we should check for aortic stenosis

## 2019-01-15 NOTE — DISCHARGE NOTE ADULT - PLAN OF CARE
Prevent readmission with shortness of breath You had a diagnostic cardiac angiogram or catheterization which shows no obstruction or stenosis of the heart vessels. Cardiac catheterization or coronary angiogram is done to understand how the heart is working and to look at the coronary arteries which supply oxygen to the heart muscles, to look at the heart chambers and the valves in the heart.  The doctor uses your groin or your arm to do the procedure  Your doctor will let you know when you can drive and do your usual physical activities  You will need to see your doctor within one week after discharge  Call your doctor if the insertion site bleeds a lot, if you get a fever or have pain, swelling, or redness where the tube went in, or if your leg feels weak, numb, or cold. A chest CT showed Small pleural and small pericardial effusions are new from 2017.   Increased number of nonenlarged mediastinal lymph nodes are slightly increased in size and number compared to the prior study. 2 mm left apical nodules unchanged. Aortic stenosis is a condition when the aortic valve in the heart does not open fully so not as much blood can flow out of the heart to the rest of the body & the heart has to work harder than usual to pump the blood out ot the heart to the rest of the body  Your doctor can hear a heart murmur  Call your doctor with shortness of breath, dizziness or fainting, or chest pain, which will  increase with physical activity  Your doctor will do echocardiograms to monitor  the condition  Take your cardiac medications as directed to minimize cardiac work load - it is important to keep your blood pressure & cholesterol levels  under control   Speak to your doctor about physical activity since this will depend on the severity of your condition.

## 2019-01-15 NOTE — PROGRESS NOTE ADULT - SUBJECTIVE AND OBJECTIVE BOX
no cp/sob    REVIEW OF SYSTEMS:  GEN: no fever,    no chills  RESP: no SOB,   no cough  CVS: no chest pain,   no palpitations  GI: no abdominal pain,   no nausea,   no vomiting,   no constipation,   no diarrhea  : no dysuria,   no frequency  NEURO: no headache,   no dizziness  PSYCH: no depression,   not anxious  Derm : no rash    MEDICATIONS  (STANDING):  amLODIPine   Tablet 10 milliGRAM(s) Oral daily  aspirin enteric coated 81 milliGRAM(s) Oral daily  atorvastatin 20 milliGRAM(s) Oral at bedtime  heparin  Injectable 5000 Unit(s) SubCutaneous every 12 hours  metoprolol tartrate 75 milliGRAM(s) Oral two times a day  nicotine - 21 mG/24Hr(s) Patch 1 patch Transdermal daily    MEDICATIONS  (PRN):      Vital Signs Last 24 Hrs  T(C): 36.7 (15 Robbin 2019 04:29), Max: 36.8 (14 Jan 2019 14:02)  T(F): 98 (15 Robbin 2019 04:29), Max: 98.2 (14 Jan 2019 14:02)  HR: 68 (15 Robbin 2019 04:29) (57 - 80)  BP: 149/88 (15 Robbin 2019 04:29) (149/88 - 159/87)  BP(mean): --  RR: 18 (15 Robbin 2019 04:29) (18 - 18)  SpO2: 94% (15 Robbin 2019 04:29) (94% - 99%)  CAPILLARY BLOOD GLUCOSE        I&O's Summary    14 Jan 2019 07:01  -  15 Robbin 2019 07:00  --------------------------------------------------------  IN: 1140 mL / OUT: 0 mL / NET: 1140 mL        PHYSICAL EXAM:  HEAD:  Atraumatic, Normocephalic  NECK: Supple, No   JVD  CHEST/LUNG:   no     rales,     no,    rhonchi  HEART: Regular rate and rhythm;         murmur  ABDOMEN: Soft, Nontender, ;   EXTREMITIES:     no   edema  NEUROLOGY:  alert    LABS:    01-15    140  |  104  |  31<H>  ----------------------------<  92  4.3   |  23  |  1.52<H>    Ca    8.6      15 Robbin 2019 05:52                      Thyroid Stimulating Hormone, Serum: 1.81 uIU/mL (01-11 @ 08:30)          Consultant(s) Notes Reviewed:      Care Discussed with Consultants/Other Providers:

## 2019-01-15 NOTE — DISCHARGE NOTE ADULT - CARE PROVIDER_API CALL
Inez Hobson), Internal Medicine  [address] [place] [address]  Phone: [phone]  Fax: [phone] Inez Hobson), Internal Medicine  [address] [place] [address]  Phone: [phone]  Fax: [phone]    Jim Lopez (), Cardiology Medicine  [address] [place] [address]  Phone: [phone]  Fax: [phone]

## 2019-01-15 NOTE — DISCHARGE NOTE ADULT - CARE PLAN
Principal Discharge DX:	Shortness of breath  Goal:	Prevent readmission with shortness of breath  Assessment and plan of treatment:	You had a diagnostic cardiac angiogram or catheterization which shows no obstruction or stenosis of the heart vessels. Cardiac catheterization or coronary angiogram is done to understand how the heart is working and to look at the coronary arteries which supply oxygen to the heart muscles, to look at the heart chambers and the valves in the heart.  The doctor uses your groin or your arm to do the procedure  Your doctor will let you know when you can drive and do your usual physical activities  You will need to see your doctor within one week after discharge  Call your doctor if the insertion site bleeds a lot, if you get a fever or have pain, swelling, or redness where the tube went in, or if your leg feels weak, numb, or cold.  Secondary Diagnosis:	Pulmonary nodules  Assessment and plan of treatment:	A chest CT showed Small pleural and small pericardial effusions are new from 2017.   Increased number of nonenlarged mediastinal lymph nodes are slightly increased in size and number compared to the prior study. 2 mm left apical nodules unchanged.  Secondary Diagnosis:	Aortic stenosis  Assessment and plan of treatment:	Aortic stenosis is a condition when the aortic valve in the heart does not open fully so not as much blood can flow out of the heart to the rest of the body & the heart has to work harder than usual to pump the blood out ot the heart to the rest of the body  Your doctor can hear a heart murmur  Call your doctor with shortness of breath, dizziness or fainting, or chest pain, which will  increase with physical activity  Your doctor will do echocardiograms to monitor  the condition  Take your cardiac medications as directed to minimize cardiac work load - it is important to keep your blood pressure & cholesterol levels  under control   Speak to your doctor about physical activity since this will depend on the severity of your condition. Principal Discharge DX:	Shortness of breath  Goal:	Prevent readmission with shortness of breath  Assessment and plan of treatment:	You had a diagnostic cardiac angiogram or catheterization which shows no obstruction or stenosis of the heart vessels. Cardiac catheterization or coronary angiogram is done to understand how the heart is working and to look at the coronary arteries which supply oxygen to the heart muscles, to look at the heart chambers and the valves in the heart.  The doctor uses your groin or your arm to do the procedure  Your doctor will let you know when you can drive and do your usual physical activities  You will need to see your doctor within one week after discharge  Call your doctor if the insertion site bleeds a lot, if you get a fever or have pain, swelling, or redness where the tube went in, or if your leg feels weak, numb, or cold.  Secondary Diagnosis:	Pulmonary nodules  Assessment and plan of treatment:	A chest CT showed Small pleural and small pericardial effusions are new from 2017.   Increased number of nonenlarged mediastinal lymph nodes are slightly increased in size and number compared to the prior study. 2 mm left apical nodules unchanged.

## 2019-01-15 NOTE — DISCHARGE NOTE ADULT - MEDICATION SUMMARY - MEDICATIONS TO TAKE
I will START or STAY ON the medications listed below when I get home from the hospital:    aspirin 81 mg oral delayed release tablet  -- 1 tab(s) by mouth once a day  -- Indication: For Stroke prevention    atorvastatin 20 mg oral tablet  -- 1 tab(s) by mouth once a day (at bedtime)  -- Indication: For High cholesterol    metoprolol tartrate 50 mg oral tablet  -- 2 tab(s) by mouth in the morning   1 tab at bedtime   -- It is very important that you take or use this exactly as directed.  Do not skip doses or discontinue unless directed by your doctor.  May cause drowsiness.  Alcohol may intensify this effect.  Use care when operating dangerous machinery.  Some non-prescription drugs may aggravate your condition.  Read all labels carefully.  If a warning appears, check with your doctor before taking.  Take with food or milk.  This drug may impair the ability to drive or operate machinery.  Use care until you become familiar with its effects.    -- Indication: For High blood pressure    amLODIPine 10 mg oral tablet  -- 1 tab(s) by mouth once a day  -- Indication: For High blood pressure

## 2019-01-15 NOTE — PROGRESS NOTE ADULT - ASSESSMENT
pt   with  h/o  htn.  not  on  any  meds    admitted  with  worsening  sob  for  past  few  weeks  from  acute  diastolic  chf/  elevated  bnp, over 4.000  tele,  nsr   iv lasix/ asa/ statin/ BB  dvt ppx   card   echo, normal  ef  cath ,  no obstruction  mri heart  noted/   d/c  when  cleared  by card/ need  to  evaluste   aortic valve    ct  chest  noted.  needs  f/p  ct in few  months      < from: Transthoracic Echocardiogram (01.11.19 @ 08:38) >  1. Mitral annular calcification. Thickened mitral valve  leaflets. Mild-moderate mitral regurgitation.  2. Calcified aortic valve with decreased opening. Peak  transaortic valve gradient equals 29 mm Hg, mean  transaortic valve gradient equals 14 mm Hg, estimated  aortic valve area equals 1.5 sqcm (by continuity equation),  aortic valve velocity time integral equals 50 cm,  consistent with moderate aortic stenosis. Mild, eccentric  aortic regurgitation.  3. Moderate left atrial enlargement.  4. Mild to moderate concentric left ventricular  hypertrophy.  5. Normal left ventricular systolic function. No segmental  wall motion abnormalities.  6. Mild diastolic dysfunction (Stage I).  7. Normal right ventricular size and function.  8. Normal pericardium with small pericardial effusion.  There is some buckling of the right atrium in diastole, but  no right ventricular collapse. The inferior vena cava  collapses with respiration. No evidence of tamponade  physiology.    < end of copied text >       < from: CT Chest No Cont (01.10.19 @ 23:28) >    IMPRESSION:   Small pleural and small pericardial effusions are new from 2017.   Increased number of nonenlarged mediastinal lymph nodes are slightly   increased in size and number compared to the prior study.  2 mm left apical nodules unchanged.  < end of copied text >

## 2019-01-15 NOTE — DISCHARGE NOTE ADULT - CARE PROVIDERS DIRECT ADDRESSES
[email or web address],[email or web address]

## 2019-05-20 ENCOUNTER — EMERGENCY (EMERGENCY)
Facility: HOSPITAL | Age: 59
LOS: 1 days | Discharge: HOME | End: 2019-05-20
Attending: EMERGENCY MEDICINE
Payer: MEDICAID

## 2019-05-20 VITALS
SYSTOLIC BLOOD PRESSURE: 175 MMHG | OXYGEN SATURATION: 99 % | HEART RATE: 82 BPM | DIASTOLIC BLOOD PRESSURE: 92 MMHG | TEMPERATURE: 98 F | RESPIRATION RATE: 19 BRPM

## 2019-05-20 VITALS
HEART RATE: 84 BPM | RESPIRATION RATE: 20 BRPM | DIASTOLIC BLOOD PRESSURE: 112 MMHG | TEMPERATURE: 98 F | SYSTOLIC BLOOD PRESSURE: 196 MMHG | WEIGHT: 186.95 LBS | HEIGHT: 66 IN | OXYGEN SATURATION: 98 %

## 2019-05-20 LAB
ANION GAP SERPL CALC-SCNC: 11 MMOL/L — SIGNIFICANT CHANGE UP (ref 5–17)
APPEARANCE UR: CLEAR — SIGNIFICANT CHANGE UP
BACTERIA # UR AUTO: NEGATIVE — SIGNIFICANT CHANGE UP
BASOPHILS # BLD AUTO: 0 K/UL — SIGNIFICANT CHANGE UP (ref 0–0.2)
BASOPHILS NFR BLD AUTO: 0.4 % — SIGNIFICANT CHANGE UP (ref 0–2)
BILIRUB UR-MCNC: NEGATIVE — SIGNIFICANT CHANGE UP
BUN SERPL-MCNC: 29 MG/DL — HIGH (ref 7–23)
CALCIUM SERPL-MCNC: 9 MG/DL — SIGNIFICANT CHANGE UP (ref 8.4–10.5)
CHLORIDE SERPL-SCNC: 104 MMOL/L — SIGNIFICANT CHANGE UP (ref 96–108)
CO2 SERPL-SCNC: 23 MMOL/L — SIGNIFICANT CHANGE UP (ref 22–31)
COLOR SPEC: SIGNIFICANT CHANGE UP
CREAT SERPL-MCNC: 1.76 MG/DL — HIGH (ref 0.5–1.3)
DIFF PNL FLD: ABNORMAL
EOSINOPHIL # BLD AUTO: 0.3 K/UL — SIGNIFICANT CHANGE UP (ref 0–0.5)
EOSINOPHIL NFR BLD AUTO: 2.7 % — SIGNIFICANT CHANGE UP (ref 0–6)
EPI CELLS # UR: 0 /HPF — SIGNIFICANT CHANGE UP
GLUCOSE SERPL-MCNC: 105 MG/DL — HIGH (ref 70–99)
GLUCOSE UR QL: NEGATIVE — SIGNIFICANT CHANGE UP
HCT VFR BLD CALC: 42.9 % — SIGNIFICANT CHANGE UP (ref 39–50)
HGB BLD-MCNC: 14.9 G/DL — SIGNIFICANT CHANGE UP (ref 13–17)
HYALINE CASTS # UR AUTO: 0 /LPF — SIGNIFICANT CHANGE UP (ref 0–2)
KETONES UR-MCNC: NEGATIVE — SIGNIFICANT CHANGE UP
LEUKOCYTE ESTERASE UR-ACNC: NEGATIVE — SIGNIFICANT CHANGE UP
LYMPHOCYTES # BLD AUTO: 1.4 K/UL — SIGNIFICANT CHANGE UP (ref 1–3.3)
LYMPHOCYTES # BLD AUTO: 13.9 % — SIGNIFICANT CHANGE UP (ref 13–44)
MCHC RBC-ENTMCNC: 31.7 PG — SIGNIFICANT CHANGE UP (ref 27–34)
MCHC RBC-ENTMCNC: 34.7 GM/DL — SIGNIFICANT CHANGE UP (ref 32–36)
MCV RBC AUTO: 91.4 FL — SIGNIFICANT CHANGE UP (ref 80–100)
MONOCYTES # BLD AUTO: 0.6 K/UL — SIGNIFICANT CHANGE UP (ref 0–0.9)
MONOCYTES NFR BLD AUTO: 6.1 % — SIGNIFICANT CHANGE UP (ref 2–14)
NEUTROPHILS # BLD AUTO: 7.9 K/UL — HIGH (ref 1.8–7.4)
NEUTROPHILS NFR BLD AUTO: 76.8 % — SIGNIFICANT CHANGE UP (ref 43–77)
NITRITE UR-MCNC: NEGATIVE — SIGNIFICANT CHANGE UP
PH UR: 5.5 — SIGNIFICANT CHANGE UP (ref 5–8)
PLATELET # BLD AUTO: 249 K/UL — SIGNIFICANT CHANGE UP (ref 150–400)
POTASSIUM SERPL-MCNC: 4.7 MMOL/L — SIGNIFICANT CHANGE UP (ref 3.5–5.3)
POTASSIUM SERPL-SCNC: 4.7 MMOL/L — SIGNIFICANT CHANGE UP (ref 3.5–5.3)
PROT UR-MCNC: ABNORMAL
RBC # BLD: 4.69 M/UL — SIGNIFICANT CHANGE UP (ref 4.2–5.8)
RBC # FLD: 12.1 % — SIGNIFICANT CHANGE UP (ref 10.3–14.5)
RBC CASTS # UR COMP ASSIST: 3 /HPF — SIGNIFICANT CHANGE UP (ref 0–4)
SODIUM SERPL-SCNC: 138 MMOL/L — SIGNIFICANT CHANGE UP (ref 135–145)
SP GR SPEC: 1.02 — SIGNIFICANT CHANGE UP (ref 1.01–1.02)
UROBILINOGEN FLD QL: NEGATIVE — SIGNIFICANT CHANGE UP
WBC # BLD: 10.2 K/UL — SIGNIFICANT CHANGE UP (ref 3.8–10.5)
WBC # FLD AUTO: 10.2 K/UL — SIGNIFICANT CHANGE UP (ref 3.8–10.5)
WBC UR QL: 1 /HPF — SIGNIFICANT CHANGE UP (ref 0–5)

## 2019-05-20 PROCEDURE — 99284 EMERGENCY DEPT VISIT MOD MDM: CPT

## 2019-05-20 PROCEDURE — 80048 BASIC METABOLIC PNL TOTAL CA: CPT

## 2019-05-20 PROCEDURE — 99283 EMERGENCY DEPT VISIT LOW MDM: CPT | Mod: 25

## 2019-05-20 PROCEDURE — 81001 URINALYSIS AUTO W/SCOPE: CPT

## 2019-05-20 PROCEDURE — 85027 COMPLETE CBC AUTOMATED: CPT

## 2019-05-20 PROCEDURE — 87086 URINE CULTURE/COLONY COUNT: CPT

## 2019-05-20 NOTE — ED PROVIDER NOTE - PHYSICAL EXAMINATION
Gen: AAO x 3, NAD  Skin: No rashes or lesions  HEENT: NC/AT, PERRLA, EOMI, MMM  Resp: unlabored CTAB  Cardiac: rrr s1s2, no murmurs, rubs or gallops  GI: ND, +BS, Soft, suprapubic tenderness. Bladder scan with about >450cc in the bladder   Ext: no pedal edema, FROM in all extremities  Neuro: no focal deficits Gen: AAO x 3, NAD  Skin: No rashes or lesions  HEENT: NC/AT, PERRLA, EOMI, MMM  Resp: unlabored CTAB  Cardiac: rrr s1s2, no murmurs, rubs or gallops  GI: ND, +BS, Soft, suprapubic tenderness. Bladder scan with about >450cc in the bladder   Ext: no pedal edema, FROM in all extremities  Neuro: no focal deficits     Attn - alert, nad, abdo - obese, soft, suprapubic tenderness, no CVAT, back - NT, extrem-,

## 2019-05-20 NOTE — ED PROVIDER NOTE - NSFOLLOWUPINSTRUCTIONS_ED_ALL_ED_FT
1. Follow up with your Urology within 24-48 hours.   2. Keep the thomson in place and drink plenty of fluids. Increase fluids.   3. Worsening pain, new fever, chills, nausea, vomiting return to ER

## 2019-05-20 NOTE — ED PROVIDER NOTE - NS ED ROS FT
Constitutional: No fever or chills  Eyes: No visual changes, eye pain or redness  HEENT: No throat pain, ear pain, nasal pain. No nose bleeding.  CV: No chest pain or lower extremity edema  Resp: No SOB no cough  GI: No abd pain. No nausea or vomiting. No diarrhea. No constipation.   : No dysuria, hematuria. +retention   MSK: No musculoskeletal pain  Skin: No rash  Neuro: No headache. No numbness or tingling. No weakness.

## 2019-05-20 NOTE — ED ADULT NURSE NOTE - OBJECTIVE STATEMENT
59y male presents to ED complaining of urinary retention. Pt is a/ox4 states that since last night he's not been able to empty his bladder and that hes been dribbling urine and having increased suprapubic pain. Pt breathing spontaneous and unlabored with lungs clear to auscultation bilaterally. Pt skin is warm, dry and intact with no edema present. Pt abdomen soft, nontender, nondistended with bowel sounds present in all 4 quadrants. Pt PERRL, with equal strength bilaterally in upper and lower extremities with full sensation. Pt denies chest pain and SOB, denies n/v/d, denies fever/chills and cough, denies numbness/tingling and weakness.

## 2019-05-20 NOTE — PROCEDURE NOTE - ADDITIONAL PROCEDURE DETAILS
Called to see patient for difficult thomson placement.  Unsuccessful attempt by ED staff.  10F silicone thomson placed under typical sterile technique after gentle meatal dilation with clamp  No complications.  Patient tolerated procedure well.  ~500cc clear yellow urine drained.  Thomson plan as per primary team.

## 2019-05-20 NOTE — ED PROVIDER NOTE - OBJECTIVE STATEMENT
59yom pmhx of HTN HLD hx of BPH (usually wakes up abour 2-3 times a night to urinate) here for unable to empty bladder since last night, woke up about 5-6 times and since this am dribbling urine. No fever or chills. No nausea or vomiting. back pain. No blood in urine. 59yom pmhx of HTN HLD hx of BPH (usually wakes up abour 2-3 times a night to urinate) here for unable to empty bladder since last night, woke up about 5-6 times and since this am dribbling urine. No fever or chills. No nausea or vomiting. back pain. No blood in urine. Pt reports taking benadryl 50mg 2 days ago for allergies. 59yom pmhx of HTN HLD hx of BPH (usually wakes up abour 2-3 times a night to urinate) here for unable to empty bladder since last night, woke up about 5-6 times and since this am dribbling urine. No fever or chills. No nausea or vomiting. back pain. No blood in urine. Pt reports taking benadryl 50mg 2 days ago for allergies.      Attn - pt seen in [facility] - agree with above - pt not able to void since last night.  pt took benadryl 2 days ago for allergies.  c/o suprapubic pain and distention. NO: fever, chills, rigors, back pain.  Hx of BPH. no hematuria or dysuria prior. no prior episodes of U retention.

## 2019-05-20 NOTE — ED PROVIDER NOTE - PROGRESS NOTE DETAILS
spoke to pt about bun/cr and leg bag. pt is scheduled to follow up with Urology tomorrow. pt agrees with the plan.

## 2019-05-21 ENCOUNTER — APPOINTMENT (OUTPATIENT)
Dept: UROLOGY | Facility: CLINIC | Age: 59
End: 2019-05-21
Payer: MEDICAID

## 2019-05-21 VITALS
BODY MASS INDEX: 30.05 KG/M2 | RESPIRATION RATE: 16 BRPM | OXYGEN SATURATION: 97 % | SYSTOLIC BLOOD PRESSURE: 136 MMHG | DIASTOLIC BLOOD PRESSURE: 82 MMHG | HEIGHT: 66 IN | WEIGHT: 187 LBS | HEART RATE: 70 BPM

## 2019-05-21 DIAGNOSIS — N40.0 BENIGN PROSTATIC HYPERPLASIA WITHOUT LOWER URINARY TRACT SYMPMS: ICD-10-CM

## 2019-05-21 DIAGNOSIS — Z87.891 PERSONAL HISTORY OF NICOTINE DEPENDENCE: ICD-10-CM

## 2019-05-21 DIAGNOSIS — Z78.9 OTHER SPECIFIED HEALTH STATUS: ICD-10-CM

## 2019-05-21 DIAGNOSIS — Z87.438 PERSONAL HISTORY OF OTHER DISEASES OF MALE GENITAL ORGANS: ICD-10-CM

## 2019-05-21 DIAGNOSIS — Z87.898 PERSONAL HISTORY OF OTHER SPECIFIED CONDITIONS: ICD-10-CM

## 2019-05-21 DIAGNOSIS — Z86.79 PERSONAL HISTORY OF OTHER DISEASES OF THE CIRCULATORY SYSTEM: ICD-10-CM

## 2019-05-21 LAB
CULTURE RESULTS: SIGNIFICANT CHANGE UP
SPECIMEN SOURCE: SIGNIFICANT CHANGE UP

## 2019-05-21 PROCEDURE — 99204 OFFICE O/P NEW MOD 45 MIN: CPT

## 2019-05-21 RX ORDER — ACETAMINOPHEN/DIPHENHYDRAMINE 500MG-25MG
81 TABLET ORAL
Qty: 30 | Refills: 0 | Status: ACTIVE | COMMUNITY
Start: 2019-02-14

## 2019-05-21 RX ORDER — TAMSULOSIN HYDROCHLORIDE 0.4 MG/1
0.4 CAPSULE ORAL
Qty: 30 | Refills: 1 | Status: ACTIVE | COMMUNITY
Start: 2019-05-21 | End: 1900-01-01

## 2019-05-21 RX ORDER — ROSUVASTATIN CALCIUM 10 MG/1
10 TABLET, FILM COATED ORAL
Qty: 90 | Refills: 0 | Status: ACTIVE | COMMUNITY
Start: 2019-05-15

## 2019-05-21 RX ORDER — ATORVASTATIN CALCIUM 20 MG/1
20 TABLET, FILM COATED ORAL
Qty: 30 | Refills: 0 | Status: ACTIVE | COMMUNITY
Start: 2019-01-15

## 2019-05-21 RX ORDER — LISINOPRIL 20 MG/1
20 TABLET ORAL
Qty: 90 | Refills: 0 | Status: ACTIVE | COMMUNITY
Start: 2019-04-03

## 2019-05-21 RX ORDER — CARVEDILOL 12.5 MG/1
12.5 TABLET, FILM COATED ORAL
Qty: 180 | Refills: 0 | Status: ACTIVE | COMMUNITY
Start: 2019-03-13

## 2019-05-21 NOTE — PHYSICAL EXAM
[General Appearance - Well Developed] : well developed [General Appearance - Well Nourished] : well nourished [Normal Appearance] : normal appearance [Well Groomed] : well groomed [General Appearance - In No Acute Distress] : no acute distress [Edema] : no peripheral edema [Respiration, Rhythm And Depth] : normal respiratory rhythm and effort [Exaggerated Use Of Accessory Muscles For Inspiration] : no accessory muscle use [Abdomen Soft] : soft [Abdomen Tenderness] : non-tender [Costovertebral Angle Tenderness] : no ~M costovertebral angle tenderness [Urethral Meatus] : meatus normal [Urinary Bladder Findings] : the bladder was normal on palpation [Scrotum] : the scrotum was normal [Testes Mass (___cm)] : there were no testicular masses [No Prostate Nodules] : no prostate nodules [FreeTextEntry1] : large smoooth no nodules [Normal Station and Gait] : the gait and station were normal for the patient's age [] : no rash [No Focal Deficits] : no focal deficits [Oriented To Time, Place, And Person] : oriented to person, place, and time [Affect] : the affect was normal [Mood] : the mood was normal [Not Anxious] : not anxious [No Palpable Adenopathy] : no palpable adenopathy

## 2019-05-21 NOTE — HISTORY OF PRESENT ILLNESS
[FreeTextEntry1] : cc retention \par 58 yo male c/o retention . went to ed yesterday had thomson placed 500 ml \par h/o circ meatus had to be dilated to place 10 fr catheter \par prior to episode progressivley worsening luts weak stream frequency \par no dysuria \par no fam h/o prostate ca

## 2019-05-21 NOTE — REVIEW OF SYSTEMS
[see HPI] : see HPI [Urine retention] : urine retention [Wake up at night to urinate  How many times?  ___] : wakes up to urinate [unfilled] times during the night [Bladder pressure] : experiences bladder pressure [Strain or push to urinate] : strain or push to urinate [Slow urine stream] : slow urine stream [Interrupted urine stream] : interrupted urine stream [Bladder fullness after urinating] : bladder fullness after urinating [Negative] : Heme/Lymph

## 2019-05-21 NOTE — ASSESSMENT
[FreeTextEntry1] : will start tamsulosin - side effects reviewed\par f/u 3 days for voiding trial \par

## 2019-05-24 ENCOUNTER — APPOINTMENT (OUTPATIENT)
Dept: UROLOGY | Facility: CLINIC | Age: 59
End: 2019-05-24
Payer: MEDICAID

## 2019-05-24 DIAGNOSIS — N13.8 BENIGN PROSTATIC HYPERPLASIA WITH LOWER URINARY TRACT SYMPMS: ICD-10-CM

## 2019-05-24 DIAGNOSIS — N40.1 BENIGN PROSTATIC HYPERPLASIA WITH LOWER URINARY TRACT SYMPMS: ICD-10-CM

## 2019-05-24 PROCEDURE — 99214 OFFICE O/P EST MOD 30 MIN: CPT

## 2019-05-24 NOTE — PHYSICAL EXAM
[General Appearance - Well Nourished] : well nourished [General Appearance - Well Developed] : well developed [Normal Appearance] : normal appearance [Well Groomed] : well groomed [General Appearance - In No Acute Distress] : no acute distress [Abdomen Soft] : soft [Abdomen Tenderness] : non-tender [Costovertebral Angle Tenderness] : no ~M costovertebral angle tenderness [Urinary Bladder Findings] : the bladder was normal on palpation [Scrotum] : the scrotum was normal [FreeTextEntry1] : cath clear urine  [] : no respiratory distress [Edema] : no peripheral edema [Respiration, Rhythm And Depth] : normal respiratory rhythm and effort [Oriented To Time, Place, And Person] : oriented to person, place, and time [Exaggerated Use Of Accessory Muscles For Inspiration] : no accessory muscle use [Affect] : the affect was normal [Mood] : the mood was normal [Not Anxious] : not anxious [Normal Station and Gait] : the gait and station were normal for the patient's age [No Focal Deficits] : no focal deficits [No Palpable Adenopathy] : no palpable adenopathy

## 2019-05-24 NOTE — ASSESSMENT
[FreeTextEntry1] : bladder filled and pt voided well after cath removed\par guven meatal dilator \par cont tamsulosin \par f/u 2 weeks for cysto \par return asap or er for difficulty voiding

## 2019-06-07 ENCOUNTER — APPOINTMENT (OUTPATIENT)
Dept: UROLOGY | Facility: CLINIC | Age: 59
End: 2019-06-07
Payer: COMMERCIAL

## 2019-06-07 VITALS
HEIGHT: 66 IN | WEIGHT: 187 LBS | SYSTOLIC BLOOD PRESSURE: 163 MMHG | HEART RATE: 63 BPM | DIASTOLIC BLOOD PRESSURE: 89 MMHG | BODY MASS INDEX: 30.05 KG/M2

## 2019-06-07 PROCEDURE — 99214 OFFICE O/P EST MOD 30 MIN: CPT

## 2019-06-14 ENCOUNTER — APPOINTMENT (OUTPATIENT)
Dept: VASCULAR SURGERY | Facility: CLINIC | Age: 59
End: 2019-06-14
Payer: COMMERCIAL

## 2019-06-14 VITALS
SYSTOLIC BLOOD PRESSURE: 135 MMHG | HEIGHT: 66 IN | TEMPERATURE: 98 F | HEART RATE: 74 BPM | DIASTOLIC BLOOD PRESSURE: 73 MMHG | WEIGHT: 187 LBS | BODY MASS INDEX: 30.05 KG/M2

## 2019-06-14 PROCEDURE — 99204 OFFICE O/P NEW MOD 45 MIN: CPT

## 2019-06-14 PROCEDURE — XXXXX: CPT

## 2019-06-14 PROCEDURE — 93924 LWR XTR VASC STDY BILAT: CPT

## 2019-06-14 NOTE — ASSESSMENT
[FreeTextEntry1] : Patient with significant cardiac disease and congestive heart failure. Arterial Dopplers with exercise along with clinical examination showed no evidence of significant peripheral arterial insufficiency. Suspect symptoms are related to cardiomyopathy. No vascular intervention necessary.\par \par Patient with carotid bruit and history of smoking with no significant carotid disease.

## 2019-06-14 NOTE — PHYSICAL EXAM
[JVD] : no jugular venous distention  [Normal Breath Sounds] : Normal breath sounds [Normal Heart Sounds] : normal heart sounds [Right Carotid Bruit] : right carotid bruit heard [Left Carotid Bruit] : left carotid bruit heard [2+] : right 2+ [Ankle Swelling (On Exam)] : not present [Abdomen Masses] : No abdominal masses [] : not present [Varicose Veins Of Lower Extremities] : not present [Skin Ulcer] : no ulcer [Oriented to Place] : oriented to place

## 2019-06-14 NOTE — CONSULT LETTER
[Dear  ___] : Dear  [unfilled], [Consult Letter:] : I had the pleasure of evaluating your patient, [unfilled]. [Please see my note below.] : Please see my note below. [Consult Closing:] : Thank you very much for allowing me to participate in the care of this patient.  If you have any questions, please do not hesitate to contact me. [Sincerely,] : Sincerely, [FreeTextEntry3] : Jin Pete M.D., F.ANNALISE.S., R.P.V.I.\par  of Vascular Surgery\par Chief, Vascular Surgery at [facility]\par Chief, Endovascular Surgery at [facility]\par Medical Director of Endovascular Program\par [facility]\par

## 2019-06-14 NOTE — HISTORY OF PRESENT ILLNESS
[FreeTextEntry1] : Patient is an 59-year-old male with past medical history significant for hypertension, congestive heart failure with severe cardiomyopathy with recent hospitalization with pulmonary edema, former heavy smoker who quit about 6 months ago, presenting towards for evaluation of bilateral lower extremity circulation. Patient reports cramping of both calves after 20 minutes walking or 30-40 steps. No complain of rest pain. No history of tissue loss.

## 2019-06-19 ENCOUNTER — OUTPATIENT (OUTPATIENT)
Dept: OUTPATIENT SERVICES | Facility: HOSPITAL | Age: 59
LOS: 1 days | End: 2019-06-19
Payer: COMMERCIAL

## 2019-06-19 VITALS
HEART RATE: 64 BPM | OXYGEN SATURATION: 97 % | SYSTOLIC BLOOD PRESSURE: 142 MMHG | HEIGHT: 67 IN | RESPIRATION RATE: 16 BRPM | DIASTOLIC BLOOD PRESSURE: 82 MMHG | WEIGHT: 197.09 LBS | TEMPERATURE: 98 F

## 2019-06-19 DIAGNOSIS — N35.819 OTHER URETHRAL STRICTURE, MALE, UNSPECIFIED SITE: ICD-10-CM

## 2019-06-19 DIAGNOSIS — I10 ESSENTIAL (PRIMARY) HYPERTENSION: ICD-10-CM

## 2019-06-19 DIAGNOSIS — Z01.818 ENCOUNTER FOR OTHER PREPROCEDURAL EXAMINATION: ICD-10-CM

## 2019-06-19 DIAGNOSIS — N35.919 UNSPECIFIED URETHRAL STRICTURE, MALE, UNSPECIFIED SITE: ICD-10-CM

## 2019-06-19 DIAGNOSIS — N35.911 UNSPECIFIED URETHRAL STRICTURE, MALE, MEATAL: ICD-10-CM

## 2019-06-19 LAB
ANION GAP SERPL CALC-SCNC: 4 MMOL/L — LOW (ref 5–17)
APPEARANCE UR: CLEAR — SIGNIFICANT CHANGE UP
APTT BLD: 34 SEC — SIGNIFICANT CHANGE UP (ref 27.5–36.3)
BACTERIA # UR AUTO: ABNORMAL /HPF
BILIRUB UR-MCNC: NEGATIVE — SIGNIFICANT CHANGE UP
BUN SERPL-MCNC: 25 MG/DL — HIGH (ref 7–18)
CALCIUM SERPL-MCNC: 8.5 MG/DL — SIGNIFICANT CHANGE UP (ref 8.4–10.5)
CHLORIDE SERPL-SCNC: 107 MMOL/L — SIGNIFICANT CHANGE UP (ref 96–108)
CO2 SERPL-SCNC: 27 MMOL/L — SIGNIFICANT CHANGE UP (ref 22–31)
COLOR SPEC: YELLOW — SIGNIFICANT CHANGE UP
CREAT SERPL-MCNC: 1.38 MG/DL — HIGH (ref 0.5–1.3)
DIFF PNL FLD: ABNORMAL
EPI CELLS # UR: ABNORMAL /HPF
GLUCOSE SERPL-MCNC: 84 MG/DL — SIGNIFICANT CHANGE UP (ref 70–99)
GLUCOSE UR QL: NEGATIVE — SIGNIFICANT CHANGE UP
HCT VFR BLD CALC: 45.4 % — SIGNIFICANT CHANGE UP (ref 39–50)
HGB BLD-MCNC: 14.6 G/DL — SIGNIFICANT CHANGE UP (ref 13–17)
INR BLD: 1.02 RATIO — SIGNIFICANT CHANGE UP (ref 0.88–1.16)
KETONES UR-MCNC: NEGATIVE — SIGNIFICANT CHANGE UP
LEUKOCYTE ESTERASE UR-ACNC: ABNORMAL
MCHC RBC-ENTMCNC: 29.4 PG — SIGNIFICANT CHANGE UP (ref 27–34)
MCHC RBC-ENTMCNC: 32.2 GM/DL — SIGNIFICANT CHANGE UP (ref 32–36)
MCV RBC AUTO: 91.3 FL — SIGNIFICANT CHANGE UP (ref 80–100)
NITRITE UR-MCNC: NEGATIVE — SIGNIFICANT CHANGE UP
NRBC # BLD: 0 /100 WBCS — SIGNIFICANT CHANGE UP (ref 0–0)
PH UR: 5 — SIGNIFICANT CHANGE UP (ref 5–8)
PLATELET # BLD AUTO: 264 K/UL — SIGNIFICANT CHANGE UP (ref 150–400)
POTASSIUM SERPL-MCNC: 4.9 MMOL/L — SIGNIFICANT CHANGE UP (ref 3.5–5.3)
POTASSIUM SERPL-SCNC: 4.9 MMOL/L — SIGNIFICANT CHANGE UP (ref 3.5–5.3)
PROT UR-MCNC: 30 MG/DL
PROTHROM AB SERPL-ACNC: 11.3 SEC — SIGNIFICANT CHANGE UP (ref 10–12.9)
RBC # BLD: 4.97 M/UL — SIGNIFICANT CHANGE UP (ref 4.2–5.8)
RBC # FLD: 12.8 % — SIGNIFICANT CHANGE UP (ref 10.3–14.5)
RBC CASTS # UR COMP ASSIST: ABNORMAL /HPF (ref 0–2)
SODIUM SERPL-SCNC: 138 MMOL/L — SIGNIFICANT CHANGE UP (ref 135–145)
SP GR SPEC: 1.02 — SIGNIFICANT CHANGE UP (ref 1.01–1.02)
UROBILINOGEN FLD QL: NEGATIVE — SIGNIFICANT CHANGE UP
WBC # BLD: 7.83 K/UL — SIGNIFICANT CHANGE UP (ref 3.8–10.5)
WBC # FLD AUTO: 7.83 K/UL — SIGNIFICANT CHANGE UP (ref 3.8–10.5)
WBC UR QL: ABNORMAL /HPF (ref 0–5)

## 2019-06-19 PROCEDURE — 81001 URINALYSIS AUTO W/SCOPE: CPT

## 2019-06-19 PROCEDURE — 85027 COMPLETE CBC AUTOMATED: CPT

## 2019-06-19 PROCEDURE — 85730 THROMBOPLASTIN TIME PARTIAL: CPT

## 2019-06-19 PROCEDURE — 80048 BASIC METABOLIC PNL TOTAL CA: CPT

## 2019-06-19 PROCEDURE — 85610 PROTHROMBIN TIME: CPT

## 2019-06-19 PROCEDURE — 36415 COLL VENOUS BLD VENIPUNCTURE: CPT

## 2019-06-19 PROCEDURE — 87086 URINE CULTURE/COLONY COUNT: CPT

## 2019-06-19 PROCEDURE — G0463: CPT

## 2019-06-19 NOTE — H&P PST ADULT - HISTORY OF PRESENT ILLNESS
59year old male with pmhx of CHF, hypertension and enlarged prostate presents with c/o urinary hesitancy, nocturia and urinary retention x 2years. Patient denies any hematuria fever or pain and is here today for presurgical testing for scheduled cystoscopy, urethral dilation on 6/24/19

## 2019-06-19 NOTE — H&P PST ADULT - NSICDXPASTMEDICALHX_GEN_ALL_CORE_FT
PAST MEDICAL HISTORY:  Enlarged prostate     History of CHF (congestive heart failure)     Hypertension

## 2019-06-19 NOTE — H&P PST ADULT - NSICDXPROBLEM_GEN_ALL_CORE_FT
PROBLEM DIAGNOSES  Problem: Hypertension  Assessment and Plan: Patient instructed to take carvedilol and lisinopril with a sip of water the morning of surgery. Patient verbalized understanding     Problem: Other urethral stricture, male, unspecified site  Assessment and Plan: Patient is scheduled for cystoscopy, urethral dilation on 6/24/19

## 2019-06-19 NOTE — H&P PST ADULT - NSANTHOSAYNRD_GEN_A_CORE
No. RUSTAM screening performed.  STOP BANG Legend: 0-2 = LOW Risk; 3-4 = INTERMEDIATE Risk; 5-8 = HIGH Risk

## 2019-06-20 LAB
CULTURE RESULTS: SIGNIFICANT CHANGE UP
SPECIMEN SOURCE: SIGNIFICANT CHANGE UP

## 2019-06-25 PROBLEM — I10 ESSENTIAL (PRIMARY) HYPERTENSION: Chronic | Status: ACTIVE | Noted: 2019-06-19

## 2019-06-25 PROBLEM — Z86.79 PERSONAL HISTORY OF OTHER DISEASES OF THE CIRCULATORY SYSTEM: Chronic | Status: ACTIVE | Noted: 2019-06-19

## 2019-06-25 PROBLEM — N40.0 BENIGN PROSTATIC HYPERPLASIA WITHOUT LOWER URINARY TRACT SYMPTOMS: Chronic | Status: ACTIVE | Noted: 2019-06-19

## 2019-06-25 NOTE — HISTORY OF PRESENT ILLNESS
[FreeTextEntry1] : cc retention \par 58 yo male c/o retention . went to ed  had thomson placed 500 ml \par h/o circ meatus had to be dilated to place 10 fr catheter \par prior to episode progressivley worsening luts weak stream frequency \par no dysuria \par cath removed voiding \par given meatal dilator but unable to

## 2019-06-25 NOTE — PHYSICAL EXAM
[Normal Appearance] : normal appearance [Abdomen Soft] : soft [General Appearance - In No Acute Distress] : no acute distress [Abdomen Tenderness] : non-tender [] : no respiratory distress [Affect] : the affect was normal [Mood] : the mood was normal [Normal Station and Gait] : the gait and station were normal for the patient's age [FreeTextEntry1] : tight meatus

## 2019-06-25 NOTE — ASSESSMENT
[FreeTextEntry1] : unable to do cysto today as meatus tight and pt unable to tolerate \par will have to schedule cysto dilation in OR \par risks and benefits reviewed \par postop cath reviewed

## 2019-07-07 ENCOUNTER — TRANSCRIPTION ENCOUNTER (OUTPATIENT)
Age: 59
End: 2019-07-07

## 2019-07-08 ENCOUNTER — OUTPATIENT (OUTPATIENT)
Dept: OUTPATIENT SERVICES | Facility: HOSPITAL | Age: 59
LOS: 1 days | Discharge: HOME | End: 2019-07-08
Payer: COMMERCIAL

## 2019-07-08 ENCOUNTER — APPOINTMENT (OUTPATIENT)
Dept: UROLOGY | Facility: HOSPITAL | Age: 59
End: 2019-07-08

## 2019-07-08 VITALS
TEMPERATURE: 98 F | DIASTOLIC BLOOD PRESSURE: 75 MMHG | SYSTOLIC BLOOD PRESSURE: 129 MMHG | RESPIRATION RATE: 16 BRPM | OXYGEN SATURATION: 95 % | HEART RATE: 64 BPM | HEIGHT: 67 IN | WEIGHT: 197.09 LBS

## 2019-07-08 VITALS
DIASTOLIC BLOOD PRESSURE: 81 MMHG | OXYGEN SATURATION: 100 % | TEMPERATURE: 98 F | SYSTOLIC BLOOD PRESSURE: 127 MMHG | HEART RATE: 66 BPM | RESPIRATION RATE: 16 BRPM

## 2019-07-08 DIAGNOSIS — N35.911 UNSPECIFIED URETHRAL STRICTURE, MALE, MEATAL: ICD-10-CM

## 2019-07-08 PROCEDURE — 87086 URINE CULTURE/COLONY COUNT: CPT

## 2019-07-08 PROCEDURE — C1758: CPT

## 2019-07-08 PROCEDURE — 52281 CYSTOSCOPY AND TREATMENT: CPT

## 2019-07-08 PROCEDURE — C1769: CPT

## 2019-07-08 RX ORDER — OXYCODONE AND ACETAMINOPHEN 5; 325 MG/1; MG/1
1 TABLET ORAL EVERY 4 HOURS
Refills: 0 | Status: DISCONTINUED | OUTPATIENT
Start: 2019-07-08 | End: 2019-07-08

## 2019-07-08 RX ORDER — ONDANSETRON 8 MG/1
4 TABLET, FILM COATED ORAL ONCE
Refills: 0 | Status: DISCONTINUED | OUTPATIENT
Start: 2019-07-08 | End: 2019-07-08

## 2019-07-08 RX ORDER — ACETAMINOPHEN 500 MG
1000 TABLET ORAL ONCE
Refills: 0 | Status: DISCONTINUED | OUTPATIENT
Start: 2019-07-08 | End: 2019-07-08

## 2019-07-08 RX ORDER — LISINOPRIL 2.5 MG/1
1 TABLET ORAL
Qty: 0 | Refills: 0 | DISCHARGE

## 2019-07-08 RX ORDER — AZTREONAM 2 G
1 VIAL (EA) INJECTION
Qty: 10 | Refills: 0
Start: 2019-07-08 | End: 2019-07-12

## 2019-07-08 RX ORDER — CARVEDILOL PHOSPHATE 80 MG/1
1 CAPSULE, EXTENDED RELEASE ORAL
Qty: 0 | Refills: 0 | DISCHARGE

## 2019-07-08 RX ORDER — HYDROMORPHONE HYDROCHLORIDE 2 MG/ML
0.5 INJECTION INTRAMUSCULAR; INTRAVENOUS; SUBCUTANEOUS
Refills: 0 | Status: DISCONTINUED | OUTPATIENT
Start: 2019-07-08 | End: 2019-07-08

## 2019-07-08 RX ORDER — SODIUM CHLORIDE 9 MG/ML
1000 INJECTION, SOLUTION INTRAVENOUS
Refills: 0 | Status: DISCONTINUED | OUTPATIENT
Start: 2019-07-08 | End: 2019-07-08

## 2019-07-08 NOTE — ASU DISCHARGE PLAN (ADULT/PEDIATRIC) - CARE PROVIDER_API CALL
Christiano Mosqueda (MD)  Urology  [address], [address]  [place] [address]  Phone: [phone]  Fax: [phone]  Follow Up Time:

## 2019-07-09 LAB
CULTURE RESULTS: NO GROWTH — SIGNIFICANT CHANGE UP
SPECIMEN SOURCE: SIGNIFICANT CHANGE UP

## 2019-07-12 ENCOUNTER — APPOINTMENT (OUTPATIENT)
Dept: UROLOGY | Facility: CLINIC | Age: 59
End: 2019-07-12
Payer: COMMERCIAL

## 2019-07-12 VITALS
SYSTOLIC BLOOD PRESSURE: 122 MMHG | HEART RATE: 74 BPM | DIASTOLIC BLOOD PRESSURE: 88 MMHG | OXYGEN SATURATION: 63 % | TEMPERATURE: 99 F

## 2019-07-12 DIAGNOSIS — R33.8 OTHER RETENTION OF URINE: ICD-10-CM

## 2019-07-12 PROCEDURE — 99213 OFFICE O/P EST LOW 20 MIN: CPT

## 2019-07-12 RX ORDER — AMLODIPINE BESYLATE 10 MG/1
10 TABLET ORAL
Qty: 30 | Refills: 0 | Status: DISCONTINUED | COMMUNITY
Start: 2019-01-15 | End: 2019-07-12

## 2019-07-12 RX ORDER — LISINOPRIL 30 MG/1
TABLET ORAL
Refills: 0 | Status: DISCONTINUED | COMMUNITY
End: 2019-07-12

## 2019-07-12 RX ORDER — CARVEDILOL 3.12 MG/1
TABLET, FILM COATED ORAL
Refills: 0 | Status: DISCONTINUED | COMMUNITY
End: 2019-07-12

## 2019-07-12 RX ORDER — LISINOPRIL 10 MG/1
10 TABLET ORAL
Qty: 30 | Refills: 0 | Status: DISCONTINUED | COMMUNITY
Start: 2019-02-14 | End: 2019-07-12

## 2019-07-12 RX ORDER — AMLODIPINE BESYLATE 2.5 MG/1
2.5 TABLET ORAL
Qty: 14 | Refills: 0 | Status: DISCONTINUED | COMMUNITY
Start: 2018-12-09 | End: 2019-07-12

## 2019-07-12 RX ORDER — METOPROLOL TARTRATE 50 MG/1
50 TABLET, FILM COATED ORAL
Qty: 90 | Refills: 0 | Status: DISCONTINUED | COMMUNITY
Start: 2019-01-15 | End: 2019-07-12

## 2019-07-12 RX ORDER — AMOXICILLIN 500 MG/1
500 TABLET, FILM COATED ORAL
Qty: 14 | Refills: 0 | Status: DISCONTINUED | COMMUNITY
Start: 2019-06-24 | End: 2019-07-12

## 2019-07-15 PROBLEM — R33.8 ACUTE URINARY RETENTION: Status: ACTIVE | Noted: 2019-05-24

## 2019-07-15 NOTE — PHYSICAL EXAM
[General Appearance - Well Developed] : well developed [General Appearance - Well Nourished] : well nourished [Normal Appearance] : normal appearance [Well Groomed] : well groomed [General Appearance - In No Acute Distress] : no acute distress [Abdomen Soft] : soft [Abdomen Tenderness] : non-tender [Costovertebral Angle Tenderness] : no ~M costovertebral angle tenderness [Urethral Meatus] : meatus normal [Urinary Bladder Findings] : the bladder was normal on palpation [Scrotum] : the scrotum was normal [Testes Mass (___cm)] : there were no testicular masses [FreeTextEntry1] : thomson clear urine  [Edema] : no peripheral edema [] : no respiratory distress [Respiration, Rhythm And Depth] : normal respiratory rhythm and effort [Exaggerated Use Of Accessory Muscles For Inspiration] : no accessory muscle use [Oriented To Time, Place, And Person] : oriented to person, place, and time [Affect] : the affect was normal [Mood] : the mood was normal [Not Anxious] : not anxious [Normal Station and Gait] : the gait and station were normal for the patient's age [No Focal Deficits] : no focal deficits [No Palpable Adenopathy] : no palpable adenopathy

## 2019-08-14 ENCOUNTER — APPOINTMENT (OUTPATIENT)
Dept: UROLOGY | Facility: CLINIC | Age: 59
End: 2019-08-14
Payer: COMMERCIAL

## 2019-08-14 VITALS
HEIGHT: 66 IN | DIASTOLIC BLOOD PRESSURE: 97 MMHG | TEMPERATURE: 98.1 F | BODY MASS INDEX: 30.37 KG/M2 | SYSTOLIC BLOOD PRESSURE: 136 MMHG | HEART RATE: 73 BPM | WEIGHT: 189 LBS

## 2019-08-14 DIAGNOSIS — N35.911 UNSPECIFIED URETHRAL STRICTURE, MALE, MEATAL: ICD-10-CM

## 2019-08-14 PROCEDURE — 99213 OFFICE O/P EST LOW 20 MIN: CPT

## 2019-08-14 NOTE — HISTORY OF PRESENT ILLNESS
[FreeTextEntry1] : s/p cysto dilation \par feels well\par no fever \par no pain \par voidingwell\par felt it was getting alittle tight and started using dilator

## 2019-08-14 NOTE — PHYSICAL EXAM
[General Appearance - Well Developed] : well developed [General Appearance - Well Nourished] : well nourished [Normal Appearance] : normal appearance [Well Groomed] : well groomed [General Appearance - In No Acute Distress] : no acute distress [Abdomen Soft] : soft [Abdomen Tenderness] : non-tender [Costovertebral Angle Tenderness] : no ~M costovertebral angle tenderness [Urinary Bladder Findings] : the bladder was normal on palpation [Scrotum] : the scrotum was normal [Testes Mass (___cm)] : there were no testicular masses [FreeTextEntry1] : meatus open  [Edema] : no peripheral edema [] : no respiratory distress [Respiration, Rhythm And Depth] : normal respiratory rhythm and effort [Exaggerated Use Of Accessory Muscles For Inspiration] : no accessory muscle use [Oriented To Time, Place, And Person] : oriented to person, place, and time [Affect] : the affect was normal [Mood] : the mood was normal [Not Anxious] : not anxious [Normal Station and Gait] : the gait and station were normal for the patient's age [No Focal Deficits] : no focal deficits [No Palpable Adenopathy] : no palpable adenopathy

## 2020-08-20 NOTE — ED ADULT NURSE NOTE - CAS EDN DISCHARGE ASSESSMENT
Spoke with pt and scheduled 3 mth virtual visit with Dr. SALVADOR for 11.18.20 at 4.15 pm. Pt verbalized understanding    Alert and oriented to person, place and time/Patient baseline mental status

## 2022-03-31 NOTE — H&P ADULT - NSHPPHYSICALEXAM_GEN_ALL_CORE
PHYSICAL EXAMINATION:  Vital Signs Last 24 Hrs  T(C): 36.7 (10 Robbin 2019 12:03), Max: 36.7 (10 Robbin 2019 12:03)  T(F): 98.1 (10 Robbin 2019 12:03), Max: 98.1 (10 Robbin 2019 12:03)  HR: 59 (10 Robbin 2019 12:03) (59 - 64)  BP: 179/96 (10 Robbin 2019 12:03) (179/96 - 206/104)  BP(mean): --  RR: 20 (10 Robbin 2019 12:03) (20 - 20)  SpO2: 100% (10 Robbin 2019 12:03) (96% - 100%)  CAPILLARY BLOOD GLUCOSE            GENERAL: NAD, well-groomed,  HEAD:  atraumatic, normocephalic  EYES: sclera anicteric  ENMT: mucous membranes moist  NECK: supple, No JVD  CHEST/LUNG: clear to auscultation bilaterally;    no      rales   ,   no rhonchi,   HEART: normal S1, S2  ABDOMEN: BS+, soft, ND, NT   EXTREMITIES:    no    edema    b/l LEs  NEURO: awake, ,     moves all extremities  SKIN: no     rash show

## 2023-11-07 NOTE — ED PROVIDER NOTE - CLINICAL SUMMARY MEDICAL DECISION MAKING FREE TEXT BOX
Detail Level: Detailed Attn - pt with Urinary retention in pt with BPH who took benadryl for allergies.  Blunt cath, Flomax and f/u with his urologist.

## 2023-11-22 NOTE — ED ADULT NURSE NOTE - CHPI ED NUR SEVERITY2
Group Topic: BH Process Group     Date: 2023  Start Time:  9:00 AM  End Time:  9:50 AM  Facilitators: Gillian Urbina LCSW; Melissa Jc LCPC; Rodrigue Sequeira LCSW; Adan BASS    Focus: Morning Review  Number in attendance: 16    Identification of problem areas while not attending treatment setting. Establishment of goals for today. Evaluation of self-administration of medications. Medium: Virtual  Patient Location: PHP/IOP Unit  Method: Group  Attendance: Present  Participation: Minimal  Patient Response: Attentive and Quiet  Mood: Anxious and Depressed  Affect: Type: Anxious and Depressed   Range: Blunted/flat   Congruency: Congruent   Stability: Stable  Behavior/Socialization: Cooperative and Passive  Thought Process: Unremarkable  Task Performance: Follows directions  Patient Evaluation: Encouragement - needs prompts    SLEEP                                                                                       # of hours: 8                                     Normal                           APPETITE     # of meals: 3  Normal    THOUGHTS of KILLING or HURTING SELF or OTHERS (In the last 24 hours)  No    MEDICATION  Are you taking medications as prescribed? Yes    Are you having any side effects? No    DRUG/ALCOHOL USE  No    HALLUCINATIONS  none    DELUSIONS  No    Rate your mood 1-10 (1 = low; 10 = high). Ratin    Any problems that occurred during the evening or morning that affected mood? Patient said her mood is neutral.  She isn't feeling anything. Treatment related goal:  Gain some insight into what brought on her present feeling state. Helpful coping skill(s) used in the last 24 hours to support wellbeing:  Staying focused and present. Staff comments: Patient is rested upon awaking. PAIN SCALE 4 OF 10.

## 2024-12-26 NOTE — PATIENT PROFILE ADULT - DO YOU FEEL UNSAFE AT HOME, WORK, OR SCHOOL?
Yasemin  is a 30 y.o. female, , Patient's last menstrual period was 2024 (exact date).,  who presents with c/o Ultrasound and Pelvic Pain      Reports menorrhagia with regular cycle and dysmenorrhea started 6 months after her D&C.  Had very light cycles immediately after x 6 months. Passing clots.  Has always had heavy cycles and is s/p D&C ~ 14 months ago, but has continued despite nuvaring.  And her cramps are severe/excruciating.  Has to take NSAIDs and use heating pads.  Cycles last a week even with nuvaring.  Does better on nuvaring compared to O[facility] earle with PMS symptoms.  Symptoms have been gradually worsening since. Associated symptoms include:  dyspareunia (moderate, almost always at least a little)  Aggravating factors: intercourse.  Alleviating actions: heating pad and NSAID      Contraception:   nuvaring .  Had multiple IUD expulsions.    HISTORY:  Yasemin     has a past medical history of Asthma, Gestational diabetes, Gestational HTN, Kidney stones, and Prolonged emergence from general anesthesia.    has a past surgical history that includes other surgical history; Lithotripsy; and Dilation and curettage of uterus (N/A, 10/27/2023)..    Her current meds are   Current Outpatient Medications:     etonogestrel-ethinyl estradiol (NUVARING) 0.12-0.015 MG/24HR vaginal ring, Place 1 each vaginally See Admin Instructions Leave ring in 3-4 weeks, then remove & discard.  After 7 days insert new ring., Disp: 3 each, Rfl: 0    ibuprofen (ADVIL;MOTRIN) 800 MG tablet, Take 1 tablet by mouth every 8 hours as needed for Pain (Patient not taking: Reported on 2024), Disp: 30 tablet, Rfl: 1     Family history is significant for family history is not on file..    ROS:Review of Systems       No results found for this visit on 24.     PHYSICAL EXAM:Blood pressure 122/78, height 1.499 m (4' 11\"), weight 77.6 kg (171 lb), last menstrual period 2024, not currently breastfeeding.  Body mass index is  no

## 2025-01-13 ENCOUNTER — INPATIENT (INPATIENT)
Facility: HOSPITAL | Age: 65
LOS: 3 days | Discharge: HOME | DRG: 204 | End: 2025-01-17
Attending: HOSPITALIST | Admitting: STUDENT IN AN ORGANIZED HEALTH CARE EDUCATION/TRAINING PROGRAM
Payer: MEDICAID

## 2025-01-13 VITALS
HEIGHT: 66 IN | TEMPERATURE: 98 F | HEART RATE: 71 BPM | SYSTOLIC BLOOD PRESSURE: 151 MMHG | RESPIRATION RATE: 18 BRPM | WEIGHT: 190.92 LBS | DIASTOLIC BLOOD PRESSURE: 94 MMHG | OXYGEN SATURATION: 98 %

## 2025-01-13 LAB
ALBUMIN SERPL ELPH-MCNC: 4.1 G/DL — SIGNIFICANT CHANGE UP (ref 3.3–5)
ALP SERPL-CCNC: 61 U/L — SIGNIFICANT CHANGE UP (ref 40–120)
ALT FLD-CCNC: 15 U/L — SIGNIFICANT CHANGE UP (ref 10–45)
ANION GAP SERPL CALC-SCNC: 17 MMOL/L — SIGNIFICANT CHANGE UP (ref 5–17)
APTT BLD: 32.6 SEC — SIGNIFICANT CHANGE UP (ref 24.5–35.6)
AST SERPL-CCNC: 25 U/L — SIGNIFICANT CHANGE UP (ref 10–40)
BASOPHILS # BLD AUTO: 0.05 K/UL — SIGNIFICANT CHANGE UP (ref 0–0.2)
BASOPHILS NFR BLD AUTO: 0.7 % — SIGNIFICANT CHANGE UP (ref 0–2)
BILIRUB SERPL-MCNC: 0.6 MG/DL — SIGNIFICANT CHANGE UP (ref 0.2–1.2)
BUN SERPL-MCNC: 46 MG/DL — HIGH (ref 7–23)
CALCIUM SERPL-MCNC: 9.3 MG/DL — SIGNIFICANT CHANGE UP (ref 8.4–10.5)
CHLORIDE SERPL-SCNC: 106 MMOL/L — SIGNIFICANT CHANGE UP (ref 96–108)
CO2 SERPL-SCNC: 16 MMOL/L — LOW (ref 22–31)
CREAT SERPL-MCNC: 2.76 MG/DL — HIGH (ref 0.5–1.3)
EGFR: 25 ML/MIN/1.73M2 — LOW
EOSINOPHIL # BLD AUTO: 0.24 K/UL — SIGNIFICANT CHANGE UP (ref 0–0.5)
EOSINOPHIL NFR BLD AUTO: 3.4 % — SIGNIFICANT CHANGE UP (ref 0–6)
FLUAV AG NPH QL: SIGNIFICANT CHANGE UP
FLUBV AG NPH QL: SIGNIFICANT CHANGE UP
GLUCOSE SERPL-MCNC: 153 MG/DL — HIGH (ref 70–99)
HCT VFR BLD CALC: 43.2 % — SIGNIFICANT CHANGE UP (ref 39–50)
HGB BLD-MCNC: 13.6 G/DL — SIGNIFICANT CHANGE UP (ref 13–17)
IMM GRANULOCYTES NFR BLD AUTO: 0.4 % — SIGNIFICANT CHANGE UP (ref 0–0.9)
INR BLD: 0.97 RATIO — SIGNIFICANT CHANGE UP (ref 0.85–1.16)
LYMPHOCYTES # BLD AUTO: 1.27 K/UL — SIGNIFICANT CHANGE UP (ref 1–3.3)
LYMPHOCYTES # BLD AUTO: 17.7 % — SIGNIFICANT CHANGE UP (ref 13–44)
MCHC RBC-ENTMCNC: 29.8 PG — SIGNIFICANT CHANGE UP (ref 27–34)
MCHC RBC-ENTMCNC: 31.5 G/DL — LOW (ref 32–36)
MCV RBC AUTO: 94.7 FL — SIGNIFICANT CHANGE UP (ref 80–100)
MONOCYTES # BLD AUTO: 0.5 K/UL — SIGNIFICANT CHANGE UP (ref 0–0.9)
MONOCYTES NFR BLD AUTO: 7 % — SIGNIFICANT CHANGE UP (ref 2–14)
NEUTROPHILS # BLD AUTO: 5.07 K/UL — SIGNIFICANT CHANGE UP (ref 1.8–7.4)
NEUTROPHILS NFR BLD AUTO: 70.8 % — SIGNIFICANT CHANGE UP (ref 43–77)
NRBC # BLD: 0 /100 WBCS — SIGNIFICANT CHANGE UP (ref 0–0)
NT-PROBNP SERPL-SCNC: 5821 PG/ML — HIGH (ref 0–300)
PLATELET # BLD AUTO: 194 K/UL — SIGNIFICANT CHANGE UP (ref 150–400)
POTASSIUM SERPL-MCNC: 4.9 MMOL/L — SIGNIFICANT CHANGE UP (ref 3.5–5.3)
POTASSIUM SERPL-SCNC: 4.9 MMOL/L — SIGNIFICANT CHANGE UP (ref 3.5–5.3)
PROCALCITONIN SERPL-MCNC: 0.1 NG/ML — SIGNIFICANT CHANGE UP (ref 0.02–0.1)
PROT SERPL-MCNC: 7.1 G/DL — SIGNIFICANT CHANGE UP (ref 6–8.3)
PROTHROM AB SERPL-ACNC: 11.2 SEC — SIGNIFICANT CHANGE UP (ref 9.9–13.4)
RBC # BLD: 4.56 M/UL — SIGNIFICANT CHANGE UP (ref 4.2–5.8)
RBC # FLD: 13.7 % — SIGNIFICANT CHANGE UP (ref 10.3–14.5)
RSV RNA NPH QL NAA+NON-PROBE: SIGNIFICANT CHANGE UP
SARS-COV-2 RNA SPEC QL NAA+PROBE: SIGNIFICANT CHANGE UP
SODIUM SERPL-SCNC: 139 MMOL/L — SIGNIFICANT CHANGE UP (ref 135–145)
TROPONIN T, HIGH SENSITIVITY RESULT: 41 NG/L — SIGNIFICANT CHANGE UP (ref 0–51)
WBC # BLD: 7.16 K/UL — SIGNIFICANT CHANGE UP (ref 3.8–10.5)
WBC # FLD AUTO: 7.16 K/UL — SIGNIFICANT CHANGE UP (ref 3.8–10.5)

## 2025-01-13 PROCEDURE — 99285 EMERGENCY DEPT VISIT HI MDM: CPT

## 2025-01-13 PROCEDURE — 71046 X-RAY EXAM CHEST 2 VIEWS: CPT | Mod: 26

## 2025-01-13 RX ORDER — FUROSEMIDE 20 MG
40 TABLET ORAL ONCE
Refills: 0 | Status: COMPLETED | OUTPATIENT
Start: 2025-01-13 | End: 2025-01-13

## 2025-01-13 RX ORDER — NICOTINE POLACRILEX 4 MG/1
1 LOZENGE ORAL ONCE
Refills: 0 | Status: COMPLETED | OUTPATIENT
Start: 2025-01-13 | End: 2025-01-13

## 2025-01-13 NOTE — ED PROVIDER NOTE - PHYSICAL EXAMINATION
Attending Nello:  Gen: NAD, AOx3, able to make needs known, non-toxic  Head: NCAT  HEENT: EOMI, oral mucosa moist, normal conjunctiva  Lung: no respiratory distress, CTAB, no wheezes/rhonchi/rales B/L, speaking in full sentences  CV: RRR, no murmurs  Abd: non distended, soft, nontender, no guarding, no CVA tenderness  MSK: no visible deformities. BLE edema  Neuro: Appears non focal  Skin: Warm, well perfused  Psych: normal affect

## 2025-01-13 NOTE — ED PROVIDER NOTE - RAPID ASSESSMENT
64-year-old male PMHx CHF, HTN presents to ED from urgent care for abnormal EKG.  Reports went to urgent care today because he has been having a productive cough for the last 3 weeks, had EKG done there and was told it was abnormal and to come to ED for evaluation.  Does report after coughing episodes he is choking on phlegm at times with some shortness of breath during those episodes, otherwise no difficulty breathing reported.  Denies dyspnea on exertion, chest pain, dizziness/lightheadedness, headache, hemoptysis, abdominal pain.    **Patient was rapidly assessed by me, Lefty Hutchison PA-C. A limited history was obtained. The patient will be seen and further examined/worked up in the main Emergency Department and their care will be completed by the main ED team along with a thorough physical exam. The receiving Emergency Department team will follow up on labs, analgesia, any clinical imaging, and perform reassessment and disposition of the patient as clinically indicated. All decisions regarding the progression of care will be made at their discretion. The patient was counseled on the limited nature of this encounter and that their care will continue in the main Emergency Department. 64-year-old male PMHx CHF, HTN presents to ED from urgent care for abnormal EKG.  Reports went to urgent care today because he has been having a productive cough for the last 3 weeks, had EKG done there and was told it was abnormal and to come to ED for evaluation.  Does report after coughing episodes he is choking on phlegm at times with some shortness of breath during those episodes, otherwise no difficulty breathing reported.  Denies dyspnea on exertion, chest pain, dizziness/lightheadedness, headache, hemoptysis, abdominal pain.    **Patient was rapidly assessed by me, Lefty Hutchison PA-C. A limited history was obtained. The patient will be seen and further examined/worked up in the main Emergency Department and their care will be completed by the main ED team along with a thorough physical exam. The receiving Emergency Department team will follow up on labs, analgesia, any clinical imaging, and perform reassessment and disposition of the patient as clinically indicated. All decisions regarding the progression of care will be made at their discretion. The patient was counseled on the limited nature of this encounter and that their care will continue in the main Emergency Department.    Attending MD Jamil: This patient was seen and orders were placed by the PA as per our department's QPA model.  I was not consulted in regards to this patient although I was present and available in the Emergency Department to the PA.  Patient was to be sent to main ED for full medical evaluation and receiving team was to follow up on any labs, analgesia, clinical imaging ordered by the PA.  Any reassessment and disposition decisions were to be made by receiving team as clinically indicated, all decisions regarding the progression of care to be made at their discretion.  I did not perform a comprehensive history and physical on this patient.

## 2025-01-13 NOTE — ED PROVIDER NOTE - CLINICAL SUMMARY MEDICAL DECISION MAKING FREE TEXT BOX
Attending Linnette: 65 y/o M w/ PMH of CKD, HTN, HLD, ?CHF, current smoker, presenting w/ abnormal EKG. Reports had gone to PCP due to persistent cough he has been having. Had EKG as part of work up and was told it was abnormal and should go to the ED. Also discussed increasing shortness of breath w/ exertion and shortness of breath w/ laying flat. Was prescribed lasix by PCP, but has not started it as he was directed to the ED for evaluation. Reports had an echocardiogram approx 8 months ago, does not know the exact results but knows it was not normal. Reports never having a stress test before. Denies fevers, chills, headache, dizziness, blurred vision, chest pain, abdominal pain, n/v/d/c, urinary symptoms, MSK pain, rash. Eval for ACS. Eval for PNA. Concern for CHF given hx and symptoms. Eval for arrhythmia. Likely admit for further cardiac work up given hx and symptoms. Will reassess the need for additional interventions as clinically warranted. Refer to any progress notes for updates on clinical course and as a continuation of this MDM. Plan for labs, imaging, EKG, meds. Will reassess the need for additional interventions as clinically warranted. Refer to any progress notes for updates on clinical course and as a continuation of this MDM.

## 2025-01-13 NOTE — ED PROVIDER NOTE - PROGRESS NOTE DETAILS
Attending Linnette: discussed w/ hospitalist regarding admission however given elevated BP, hopsitalist would want BP to improve prior to floor admission. home bp meds and lasix have been given. signed out to Dr. Thompson pending BP improvement for admission. Attending Sergio Thompson:  Patient signed out to me, here for kay, concern for decompensated new HF, given lasix., addl 12.5mg coreg, hydral 5mg iv given kay. BP 170s/75. admitted to hospitalist.

## 2025-01-13 NOTE — ED ADULT TRIAGE NOTE - BIRTH SEX
Male Detail Level: Detailed Quality 130: Documentation Of Current Medications In The Medical Record: Current Medications Documented Quality 431: Preventive Care And Screening: Unhealthy Alcohol Use - Screening: Patient not identified as an unhealthy alcohol user when screened for unhealthy alcohol use using a systematic screening method Quality 226: Preventive Care And Screening: Tobacco Use: Screening And Cessation Intervention: Patient screened for tobacco use and is an ex/non-smoker

## 2025-01-13 NOTE — ED PROVIDER NOTE - OBJECTIVE STATEMENT
Attending Linnette: 63 y/o M w/ PMH of CKD, HTN, HLD, ?CHF, current smoker, presenting w/ abnormal EKG. Reports had gone to PCP due to persistent cough he has been having. Had EKG as part of work up and was told it was abnormal and should go to the ED. Also discussed increasing shortness of breath w/ exertion and shortness of breath w/ laying flat. Was prescribed lasix by PCP, but has not started it as he was directed to the ED for evaluation. Reports had an echocardiogram approx 8 months ago, does not know the exact results but knows it was not normal. Reports never having a stress test before. Denies fevers, chills, headache, dizziness, blurred vision, chest pain, abdominal pain, n/v/d/c, urinary symptoms, MSK pain, rash.

## 2025-01-13 NOTE — ED PROVIDER NOTE - CARE PLAN
1 Principal Discharge DX:	SOB (shortness of breath)  Secondary Diagnosis:	PIO (acute kidney injury)

## 2025-01-14 ENCOUNTER — RESULT REVIEW (OUTPATIENT)
Age: 65
End: 2025-01-14

## 2025-01-14 DIAGNOSIS — N18.2 CHRONIC KIDNEY DISEASE, STAGE 2 (MILD): ICD-10-CM

## 2025-01-14 DIAGNOSIS — F17.200 NICOTINE DEPENDENCE, UNSPECIFIED, UNCOMPLICATED: ICD-10-CM

## 2025-01-14 DIAGNOSIS — R06.02 SHORTNESS OF BREATH: ICD-10-CM

## 2025-01-14 DIAGNOSIS — I50.9 HEART FAILURE, UNSPECIFIED: ICD-10-CM

## 2025-01-14 DIAGNOSIS — E78.5 HYPERLIPIDEMIA, UNSPECIFIED: ICD-10-CM

## 2025-01-14 DIAGNOSIS — I10 ESSENTIAL (PRIMARY) HYPERTENSION: ICD-10-CM

## 2025-01-14 LAB
ANION GAP SERPL CALC-SCNC: 15 MMOL/L — SIGNIFICANT CHANGE UP (ref 5–17)
APPEARANCE UR: CLEAR — SIGNIFICANT CHANGE UP
BACTERIA # UR AUTO: NEGATIVE /HPF — SIGNIFICANT CHANGE UP
BILIRUB UR-MCNC: NEGATIVE — SIGNIFICANT CHANGE UP
BUN SERPL-MCNC: 45 MG/DL — HIGH (ref 7–23)
CALCIUM SERPL-MCNC: 9.6 MG/DL — SIGNIFICANT CHANGE UP (ref 8.4–10.5)
CAST: 1 /LPF — SIGNIFICANT CHANGE UP (ref 0–4)
CHLORIDE SERPL-SCNC: 102 MMOL/L — SIGNIFICANT CHANGE UP (ref 96–108)
CO2 SERPL-SCNC: 24 MMOL/L — SIGNIFICANT CHANGE UP (ref 22–31)
COLOR SPEC: YELLOW — SIGNIFICANT CHANGE UP
CREAT ?TM UR-MCNC: 65 MG/DL — SIGNIFICANT CHANGE UP
CREAT SERPL-MCNC: 2.7 MG/DL — HIGH (ref 0.5–1.3)
DIFF PNL FLD: ABNORMAL
EGFR: 26 ML/MIN/1.73M2 — LOW
GLUCOSE SERPL-MCNC: 102 MG/DL — HIGH (ref 70–99)
GLUCOSE UR QL: NEGATIVE MG/DL — SIGNIFICANT CHANGE UP
HCT VFR BLD CALC: 46.8 % — SIGNIFICANT CHANGE UP (ref 39–50)
HGB BLD-MCNC: 14.7 G/DL — SIGNIFICANT CHANGE UP (ref 13–17)
KETONES UR-MCNC: NEGATIVE MG/DL — SIGNIFICANT CHANGE UP
LEUKOCYTE ESTERASE UR-ACNC: ABNORMAL
MCHC RBC-ENTMCNC: 28.8 PG — SIGNIFICANT CHANGE UP (ref 27–34)
MCHC RBC-ENTMCNC: 31.4 G/DL — LOW (ref 32–36)
MCV RBC AUTO: 91.6 FL — SIGNIFICANT CHANGE UP (ref 80–100)
NITRITE UR-MCNC: NEGATIVE — SIGNIFICANT CHANGE UP
NRBC # BLD: 0 /100 WBCS — SIGNIFICANT CHANGE UP (ref 0–0)
OSMOLALITY UR: 382 MOS/KG — SIGNIFICANT CHANGE UP (ref 300–900)
PH UR: 6.5 — SIGNIFICANT CHANGE UP (ref 5–8)
PLATELET # BLD AUTO: 227 K/UL — SIGNIFICANT CHANGE UP (ref 150–400)
POTASSIUM SERPL-MCNC: 4.2 MMOL/L — SIGNIFICANT CHANGE UP (ref 3.5–5.3)
POTASSIUM SERPL-SCNC: 4.2 MMOL/L — SIGNIFICANT CHANGE UP (ref 3.5–5.3)
POTASSIUM UR-SCNC: 26 MMOL/L — SIGNIFICANT CHANGE UP
PROT ?TM UR-MCNC: 84 MG/DL — HIGH (ref 0–12)
PROT UR-MCNC: 100 MG/DL
PROT/CREAT UR-RTO: 1.3 RATIO — HIGH (ref 0–0.2)
RBC # BLD: 5.11 M/UL — SIGNIFICANT CHANGE UP (ref 4.2–5.8)
RBC # FLD: 13.9 % — SIGNIFICANT CHANGE UP (ref 10.3–14.5)
RBC CASTS # UR COMP ASSIST: 1 /HPF — SIGNIFICANT CHANGE UP (ref 0–4)
SODIUM SERPL-SCNC: 141 MMOL/L — SIGNIFICANT CHANGE UP (ref 135–145)
SODIUM UR-SCNC: 74 MMOL/L — SIGNIFICANT CHANGE UP
SP GR SPEC: 1.01 — SIGNIFICANT CHANGE UP (ref 1–1.03)
SQUAMOUS # UR AUTO: 2 /HPF — SIGNIFICANT CHANGE UP (ref 0–5)
TROPONIN T, HIGH SENSITIVITY RESULT: 44 NG/L — SIGNIFICANT CHANGE UP (ref 0–51)
UROBILINOGEN FLD QL: 0.2 MG/DL — SIGNIFICANT CHANGE UP (ref 0.2–1)
WBC # BLD: 8.76 K/UL — SIGNIFICANT CHANGE UP (ref 3.8–10.5)
WBC # FLD AUTO: 8.76 K/UL — SIGNIFICANT CHANGE UP (ref 3.8–10.5)
WBC UR QL: 9 /HPF — HIGH (ref 0–5)

## 2025-01-14 PROCEDURE — 99223 1ST HOSP IP/OBS HIGH 75: CPT | Mod: GC

## 2025-01-14 PROCEDURE — 93306 TTE W/DOPPLER COMPLETE: CPT | Mod: 26

## 2025-01-14 PROCEDURE — 93356 MYOCRD STRAIN IMG SPCKL TRCK: CPT

## 2025-01-14 RX ORDER — FUROSEMIDE 20 MG
40 TABLET ORAL DAILY
Refills: 0 | Status: DISCONTINUED | OUTPATIENT
Start: 2025-01-15 | End: 2025-01-15

## 2025-01-14 RX ORDER — HYDRALAZINE HYDROCHLORIDE 10 MG/1
5 TABLET ORAL ONCE
Refills: 0 | Status: COMPLETED | OUTPATIENT
Start: 2025-01-14 | End: 2025-01-14

## 2025-01-14 RX ORDER — CARVEDILOL 25 MG/1
12.5 TABLET, FILM COATED ORAL ONCE
Refills: 0 | Status: COMPLETED | OUTPATIENT
Start: 2025-01-14 | End: 2025-01-14

## 2025-01-14 RX ORDER — NIFEDIPINE 60 MG/1
60 TABLET, EXTENDED RELEASE ORAL DAILY
Refills: 0 | Status: DISCONTINUED | OUTPATIENT
Start: 2025-01-15 | End: 2025-01-17

## 2025-01-14 RX ORDER — NIFEDIPINE 60 MG/1
30 TABLET, EXTENDED RELEASE ORAL DAILY
Refills: 0 | Status: DISCONTINUED | OUTPATIENT
Start: 2025-01-14 | End: 2025-01-14

## 2025-01-14 RX ORDER — HYDRALAZINE HYDROCHLORIDE 10 MG/1
10 TABLET ORAL ONCE
Refills: 0 | Status: COMPLETED | OUTPATIENT
Start: 2025-01-14 | End: 2025-01-14

## 2025-01-14 RX ORDER — ATORVASTATIN CALCIUM 40 MG/1
20 TABLET, FILM COATED ORAL AT BEDTIME
Refills: 0 | Status: DISCONTINUED | OUTPATIENT
Start: 2025-01-14 | End: 2025-01-17

## 2025-01-14 RX ORDER — CARVEDILOL 25 MG/1
12.5 TABLET, FILM COATED ORAL EVERY 12 HOURS
Refills: 0 | Status: DISCONTINUED | OUTPATIENT
Start: 2025-01-14 | End: 2025-01-17

## 2025-01-14 RX ORDER — NIFEDIPINE 60 MG/1
30 TABLET, EXTENDED RELEASE ORAL ONCE
Refills: 0 | Status: COMPLETED | OUTPATIENT
Start: 2025-01-14 | End: 2025-01-14

## 2025-01-14 RX ORDER — ASPIRIN 81 MG
81 TABLET, DELAYED RELEASE (ENTERIC COATED) ORAL DAILY
Refills: 0 | Status: DISCONTINUED | OUTPATIENT
Start: 2025-01-14 | End: 2025-01-17

## 2025-01-14 RX ORDER — HYDRALAZINE HYDROCHLORIDE 10 MG/1
15 TABLET ORAL EVERY 4 HOURS
Refills: 0 | Status: DISCONTINUED | OUTPATIENT
Start: 2025-01-14 | End: 2025-01-15

## 2025-01-14 RX ADMIN — NIFEDIPINE 30 MILLIGRAM(S): 60 TABLET, EXTENDED RELEASE ORAL at 12:49

## 2025-01-14 RX ADMIN — CARVEDILOL 12.5 MILLIGRAM(S): 25 TABLET, FILM COATED ORAL at 16:44

## 2025-01-14 RX ADMIN — CARVEDILOL 12.5 MILLIGRAM(S): 25 TABLET, FILM COATED ORAL at 03:38

## 2025-01-14 RX ADMIN — CARVEDILOL 12.5 MILLIGRAM(S): 25 TABLET, FILM COATED ORAL at 00:34

## 2025-01-14 RX ADMIN — ATORVASTATIN CALCIUM 20 MILLIGRAM(S): 40 TABLET, FILM COATED ORAL at 21:47

## 2025-01-14 RX ADMIN — Medication 40 MILLIGRAM(S): at 00:34

## 2025-01-14 RX ADMIN — NIFEDIPINE 30 MILLIGRAM(S): 60 TABLET, EXTENDED RELEASE ORAL at 00:34

## 2025-01-14 RX ADMIN — HYDRALAZINE HYDROCHLORIDE 10 MILLIGRAM(S): 10 TABLET ORAL at 16:58

## 2025-01-14 RX ADMIN — HYDRALAZINE HYDROCHLORIDE 5 MILLIGRAM(S): 10 TABLET ORAL at 03:38

## 2025-01-14 RX ADMIN — Medication 81 MILLIGRAM(S): at 13:27

## 2025-01-14 RX ADMIN — NICOTINE POLACRILEX 1 PATCH: 4 LOZENGE ORAL at 00:34

## 2025-01-14 NOTE — H&P ADULT - PROBLEM SELECTOR PLAN 4
nicotine patch    dispo: likely home pending collateral information from PCP- OP EKG, baseline Cr. Repeat TTE and improvement in PIO- possibly later today vs. tomorrow    plan d/w and agreed on by pt.

## 2025-01-14 NOTE — ED ADULT NURSE REASSESSMENT NOTE - NS ED NURSE REASSESS COMMENT FT1
pt received in purple hallway pending tele bed assignment pt placed on portable cardiac monitor pt laet oriented denies any discomfort resting comfortably

## 2025-01-14 NOTE — H&P ADULT - PROBLEM SELECTOR PLAN 3
PIO on CKD with last Cr in HIE 2019 1.3-1.5  - Cr currently 2.76  - can f/u urine lytes, bladder scan  - recheck Cr after pt received lasix  - awaiting cb from pcp for further collateral

## 2025-01-14 NOTE — H&P ADULT - HISTORY OF PRESENT ILLNESS
65 y/o M w/ PMH of CKD, HTN, HLD, ?CHF, current smoker, presenting w/ abnormal EKG from PCP office. States he has had heart failure in the past, was admitted to the hospital in 2020 for it and since has been stable. Also had angiogram at that time, reportedly normal. Went to PCP yesterday because he'd been experiencing slowly progressive MORALES and orthopnea over the past few weeks to months. PCP did an EKG and urged patient to come to ED due to abnormalities. Pt is unsure what they are. States he provided the ED with a copy of the abnormal EKG. ED documentation notes pt was sent in for abnormal EKG and proceeds to not include anything about the OP EKG interpretation. Just notes patient came in with HTN crisis and PIO for which they gave him BP meds and lasix. At present, patient resting comfortably in stretcher on his side, feels comfortable, no cp/palpitations, no sob/cough, no HA/vision problems.    In ED: Given home bp meds coreg, nifedipine, and lasix

## 2025-01-14 NOTE — H&P ADULT - NSHPADDITIONALINFOADULT_GEN_ALL_CORE
The necessity of the time spent during the encounter on this date of service was due to:   - Ordering, reviewing, and interpreting labs, testing, and imaging.  - Independently obtaining a review of systems and performing a physical exam  - Reviewing prior hospitalization and where necessary, outpatient records.  - Counselling and educating patient and family regarding interpretation of aforementioned items and plan of care.    Time-based billing (NON-critical care). Total minutes spent: 87

## 2025-01-14 NOTE — PATIENT PROFILE ADULT - NSPROPTRIGHTNOTIFY_GEN_A_NUR
Mom is worried because Esmer hasn't eating anything since her morning bottle. It's been about 7 1/2 hours since she's eaten, and she normally eats at least every 4 hours. She's fussy, which is not normal for her either.   declines

## 2025-01-14 NOTE — H&P ADULT - ASSESSMENT
Daily Note     Today's date: 2019  Patient name: Thu Hurd  : 1935  MRN: [id number]  Referring provider: Claudia Luis  Dx:   Encounter Diagnosis     ICD-10-CM    1  Status post reverse total arthroplasty of right shoulder Z96 611    2  Acute pain of right shoulder M25 511        Start Time: 09  Stop Time: 1040  Total time in clinic (min): 46 minutes    Subjective: I am better than the last week you were here  Objective: See treatment diary below      Assessment: Tolerated treatment well  Patient demonstrated fatigue post treatment, exhibited good technique with therapeutic exercises and would benefit from continued PT      Plan: Continue per plan of care  Progress treatment as tolerated         Precautions: Falls, hypotension, Follow attached Reverse Shoulder Protcol sx 19  Precautions: Falls, hypotension, Follow attached Reverse Shoulder Protcol sx 19    Manual  6/18 06/27 07/02 7/03 7/8 7/10 7/12      PROM  LB  LMR LB LB RK rk LB      Upper trap STM  LB  LB LB RK rk LB      Elbow PROM  LB LMR LB LB RK rk LB                                    Exercise Diary  6/18 06/27 7/2 7/03 7/8 7/10 7/12      Pulleys FF  nv  8 min  8  8' 8' 8'      scap squeezes   5"x5 3x5 YTB 2x15 YTB  2x15 YTB  2x15 GTB 3x15      Table slides FF abd    3x10  3x10  3x10 3x10 3x10      Sleeper stretch gentle Follow protcol              JAGUAR terminal elbow ext     2x15 3x10 3x10 3x10  2#      Mod tandem              SLS              FT FA EOEC             digiflex     2x15 2x15 2x15  green       AAROM FF ABD     3x10 3x10  ABD, ER,FF 3x10  ABD,  FF 3x10  incline table       Isometric ball 6 way        20x                                                                                                                               Modalities
64m hx HTN, ?CHF, HLD, active smoker presenting from PCP office with abnormal EKG in the setting of chronic progressively worsening MORALES and orthopnea, concerning for mild ADHF

## 2025-01-14 NOTE — H&P ADULT - PROBLEM SELECTOR PLAN 1
Pt reports remote hx of hospitalization for HF and normal angiogram however is not currently on GDMT and is otherwise a poor historian. Has been having chronic progressively worsening MORALES and orthopnea as OP which prompted visit to PCP who sent him in for abnormal EKG but EKG no longer found.   - Last TTE in system 2019 with moderate AS, normal LV systolic function, grade I diastolic dysfunction.  - EKG here without any acute concerning findings, reached out to Dr. Hobson's office and cell [phone]   for further collateral, awaiting response  - tropes indeterminate, pro BNP is elevated but pt also has CKD  - pt with expiratory wheezes on exam but no crackles, no significant JVD, no peripheral edema. Unclear if this is truly ADHF vs. HTN crisis which is now improved after receiving BP meds and lasix vs. possible COPD given active smoker  - continue home BP meds as tolerated, can continue daily lasix 20mg iv for now  - f/u repeat TTE to track AS- if collateral obtained and pt doesn't warrant further inpatient stay, can f/u TTE as OP, patient is amenable  - monitor strict i/o    #SOB- pt reports being given antibiotics recently, unclear diagnosis  - would hold off further abx for now, awaiting PCP call back for collateral Pt reports remote hx of hospitalization for HF and normal angiogram however is not currently on GDMT and is otherwise a poor historian. Has been having chronic progressively worsening MORALES and orthopnea as OP which prompted visit to PCP who sent him in for abnormal EKG but EKG no longer found.   - pt notes he lost cards f/u due to insurance change and hasn't seen anyone in years- will refer to Dr. Alva on dc  - Last TTE in system 2019 with moderate AS, normal LV systolic function, grade I diastolic dysfunction.  - EKG here without any acute concerning findings, reached out to Dr. Hobson's office and cell [phone]   for further collateral, awaiting response  - tropes indeterminate, pro BNP is elevated but pt also has CKD  - pt with expiratory wheezes on exam but no crackles, no significant JVD, no peripheral edema. Unclear if this is truly ADHF vs. HTN crisis which is now improved after receiving BP meds and lasix vs. possible COPD given active smoker  - continue home BP meds as tolerated, can continue daily lasix 20mg iv for now  - f/u repeat TTE to track AS- if collateral obtained and pt doesn't warrant further inpatient stay, can f/u TTE as OP, patient is amenable  - monitor strict i/o Pt reports remote hx of hospitalization for HF and normal angiogram however is not currently on GDMT and is otherwise a poor historian. Has been having chronic progressively worsening MORALES and orthopnea as OP which prompted visit to PCP who sent him in for abnormal EKG but EKG no longer found.   - pt notes he lost cards f/u due to insurance change and hasn't seen anyone in years- was referred to cards by pcp, but will also give Dr. Alva referral on dc  - Last TTE in system 2019 with moderate AS, normal LV systolic function, grade I diastolic dysfunction- would repeat now  - EKG here without any acute concerning findings, reached out to Dr. Hobson's office and cell [phone]   for further collateral, OP EKG showed new TWI in lateral leads, now resolved on our EKGs.  - tropes indeterminate, pro BNP is elevated but pt also has CKD  - pt with expiratory wheezes on exam but no crackles, no significant JVD, no peripheral edema. Unclear if this is truly ADHF vs. HTN crisis which is now improved after receiving BP meds and lasix vs. possible COPD given active smoker  - continue home BP meds as tolerated, can continue daily lasix 40mg iv for now  - f/u repeat TTE to track AS- if collateral obtained and pt doesn't warrant further inpatient stay, can f/u TTE as OP, patient is amenable  - monitor strict i/o

## 2025-01-14 NOTE — ED ADULT NURSE NOTE - OBJECTIVE STATEMENT
Pt is a 63 y/o male PMH CHF and HTN presents to the ED c/o abnormal ECG. Pt states he went to  d/t a productive cough he has had for about 3 weeks.  did an ECG and stated it was abnormal and referred him to the ED. Pt denies chest pain, dyspnea on exertion, HA/dizziness, vision changes, abdominal pain or GI/ symptoms.

## 2025-01-14 NOTE — H&P ADULT - NSHPLABSRESULTS_GEN_ALL_CORE
.  LABS:                         13.6   7.16  )-----------( 194      ( 13 Jan 2025 19:24 )             43.2     01-13    139  |  106  |  46[H]  ----------------------------<  153[H]  4.9   |  16[L]  |  2.76[H]    Ca    9.3      13 Jan 2025 19:24    TPro  7.1  /  Alb  4.1  /  TBili  0.6  /  DBili  x   /  AST  25  /  ALT  15  /  AlkPhos  61  01-13    PT/INR - ( 13 Jan 2025 19:24 )   PT: 11.2 sec;   INR: 0.97 ratio         PTT - ( 13 Jan 2025 19:24 )  PTT:32.6 sec  Urinalysis Basic - ( 13 Jan 2025 19:24 )    Color: x / Appearance: x / SG: x / pH: x  Gluc: 153 mg/dL / Ketone: x  / Bili: x / Urobili: x   Blood: x / Protein: x / Nitrite: x   Leuk Esterase: x / RBC: x / WBC x   Sq Epi: x / Non Sq Epi: x / Bacteria: x            RADIOLOGY, EKG & ADDITIONAL TESTS: Reviewed.   EKG personally reviewed: NSR without any ischemic changes  OP EKG not in chart and patient no longer has it on his person.

## 2025-01-14 NOTE — ED ADULT NURSE NOTE - NSFALLUNIVINTERV_ED_ALL_ED
Bed/Stretcher in lowest position, wheels locked, appropriate side rails in place/Call bell, personal items and telephone in reach/Instruct patient to call for assistance before getting out of bed/chair/stretcher/Non-slip footwear applied when patient is off stretcher/[place] to call system/Physically safe environment - no spills, clutter or unnecessary equipment/Purposeful proactive rounding/Room/bathroom lighting operational, light cord in reach

## 2025-01-14 NOTE — ED ADULT NURSE REASSESSMENT NOTE - NS ED NURSE REASSESS COMMENT FT1
pt ambulatory to bathroom urine specimen to lab post void residual greater than 70ml om bladder scan pt resting comfortably pending tele bed assignment

## 2025-01-15 DIAGNOSIS — J44.1 CHRONIC OBSTRUCTIVE PULMONARY DISEASE WITH (ACUTE) EXACERBATION: ICD-10-CM

## 2025-01-15 LAB
ANION GAP SERPL CALC-SCNC: 18 MMOL/L — HIGH (ref 5–17)
BUN SERPL-MCNC: 53 MG/DL — HIGH (ref 7–23)
CALCIUM SERPL-MCNC: 9.3 MG/DL — SIGNIFICANT CHANGE UP (ref 8.4–10.5)
CHLORIDE SERPL-SCNC: 103 MMOL/L — SIGNIFICANT CHANGE UP (ref 96–108)
CO2 SERPL-SCNC: 18 MMOL/L — LOW (ref 22–31)
CREAT SERPL-MCNC: 2.88 MG/DL — HIGH (ref 0.5–1.3)
EGFR: 24 ML/MIN/1.73M2 — LOW
GLUCOSE SERPL-MCNC: 92 MG/DL — SIGNIFICANT CHANGE UP (ref 70–99)
POTASSIUM SERPL-MCNC: 3.8 MMOL/L — SIGNIFICANT CHANGE UP (ref 3.5–5.3)
POTASSIUM SERPL-SCNC: 3.8 MMOL/L — SIGNIFICANT CHANGE UP (ref 3.5–5.3)
SODIUM SERPL-SCNC: 139 MMOL/L — SIGNIFICANT CHANGE UP (ref 135–145)
UUN UR-MCNC: 528 MG/DL — SIGNIFICANT CHANGE UP

## 2025-01-15 PROCEDURE — 99232 SBSQ HOSP IP/OBS MODERATE 35: CPT

## 2025-01-15 RX ORDER — NICOTINE POLACRILEX 4 MG/1
1 LOZENGE ORAL DAILY
Refills: 0 | Status: DISCONTINUED | OUTPATIENT
Start: 2025-01-15 | End: 2025-01-17

## 2025-01-15 RX ORDER — NIFEDIPINE 60 MG/1
30 TABLET, EXTENDED RELEASE ORAL
Refills: 0 | DISCHARGE

## 2025-01-15 RX ORDER — LOSARTAN POTASSIUM 100 MG/1
50 TABLET, FILM COATED ORAL DAILY
Refills: 0 | Status: DISCONTINUED | OUTPATIENT
Start: 2025-01-16 | End: 2025-01-16

## 2025-01-15 RX ORDER — ROSUVASTATIN 40 MG/1
1 TABLET, FILM COATED ORAL
Refills: 0 | DISCHARGE

## 2025-01-15 RX ORDER — NIFEDIPINE 60 MG/1
1 TABLET, EXTENDED RELEASE ORAL
Refills: 0 | DISCHARGE

## 2025-01-15 RX ORDER — FLUTICASONE PROPIONATE AND SALMETEROL 50; 500 UG/1; UG/1
1 POWDER ORAL; RESPIRATORY (INHALATION)
Refills: 0 | Status: DISCONTINUED | OUTPATIENT
Start: 2025-01-15 | End: 2025-01-17

## 2025-01-15 RX ORDER — LOSARTAN POTASSIUM 100 MG/1
50 TABLET, FILM COATED ORAL ONCE
Refills: 0 | Status: COMPLETED | OUTPATIENT
Start: 2025-01-15 | End: 2025-01-15

## 2025-01-15 RX ADMIN — NICOTINE POLACRILEX 1 PATCH: 4 LOZENGE ORAL at 13:40

## 2025-01-15 RX ADMIN — Medication 81 MILLIGRAM(S): at 11:58

## 2025-01-15 RX ADMIN — NICOTINE POLACRILEX 1 PATCH: 4 LOZENGE ORAL at 19:59

## 2025-01-15 RX ADMIN — CARVEDILOL 12.5 MILLIGRAM(S): 25 TABLET, FILM COATED ORAL at 17:23

## 2025-01-15 RX ADMIN — NIFEDIPINE 60 MILLIGRAM(S): 60 TABLET, EXTENDED RELEASE ORAL at 06:05

## 2025-01-15 RX ADMIN — CARVEDILOL 12.5 MILLIGRAM(S): 25 TABLET, FILM COATED ORAL at 06:05

## 2025-01-15 RX ADMIN — ATORVASTATIN CALCIUM 20 MILLIGRAM(S): 40 TABLET, FILM COATED ORAL at 22:00

## 2025-01-15 RX ADMIN — Medication 40 MILLIGRAM(S): at 06:05

## 2025-01-15 RX ADMIN — LOSARTAN POTASSIUM 50 MILLIGRAM(S): 100 TABLET, FILM COATED ORAL at 12:02

## 2025-01-15 RX ADMIN — FLUTICASONE PROPIONATE AND SALMETEROL 1 DOSE(S): 50; 500 POWDER ORAL; RESPIRATORY (INHALATION) at 22:01

## 2025-01-15 NOTE — PROGRESS NOTE ADULT - SUBJECTIVE AND OBJECTIVE BOX
[facility]  Mireya Green MD  Pager (M-F, 0E-[phone]): [phone]  Other Times:  [phone]    Patient is a 64y old  Male who presents with a chief complaint of sob, orthopnea (15 Robbin 2025 10:41)      SUBJECTIVE / OVERNIGHT EVENTS:  feeling fine. denies any specific complaints. afebrile.     ADDITIONAL REVIEW OF SYSTEMS: otherwise neg    MEDICATIONS  (STANDING):  aspirin enteric coated 81 milliGRAM(s) Oral daily  atorvastatin 20 milliGRAM(s) Oral at bedtime  carvedilol 12.5 milliGRAM(s) Oral every 12 hours  fluticasone propionate/ salmeterol 250-50 MICROgram(s) Diskus 1 Dose(s) Inhalation two times a day  losartan 50 milliGRAM(s) Oral once  NIFEdipine XL 60 milliGRAM(s) Oral daily    MEDICATIONS  (PRN):      CAPILLARY BLOOD GLUCOSE        I&O's Summary      PHYSICAL EXAM:  Vital Signs Last 24 Hrs  T(C): 37 (15 Robbin 2025 03:52), Max: 37.1 (14 Jan 2025 16:27)  T(F): 98.6 (15 Robbin 2025 03:52), Max: 98.7 (14 Jan 2025 16:27)  HR: 79 (15 Robbin 2025 06:03) (67 - 85)  BP: 164/89 (15 Robbin 2025 06:03) (148/78 - 198/96)  BP(mean): --  RR: 18 (15 Robbin 2025 03:52) (17 - 18)  SpO2: 97% (15 Robbin 2025 03:52) (95% - 98%)    Parameters below as of 15 Robbin 2025 03:52  Patient On (Oxygen Delivery Method): room air        CONSTITUTIONAL: NAD, well-groomed  EYES:  conjunctiva and sclera clear  ENMT: Moist oral mucosa  NECK: Supple, no palpable masses; no JVD  RESPIRATORY: Normal respiratory effort; lungs are clear to auscultation bilaterally  CARDIOVASCULAR: Regular rate and rhythm, normal S1 and S2, no murmur/rub/gallop; No lower extremity edema  ABDOMEN: Nontender to palpation, normoactive bowel sounds, no rebound/guarding  MUSCULOSKELETAL:  no clubbing or cyanosis of digits; no joint swelling or tenderness to palpation  PSYCH: A+O to person, place, and time; affect appropriate  SKIN: No rashes; no palpable lesions    LABS:                        14.7   8.76  )-----------( 227      ( 14 Jan 2025 10:37 )             46.8     01-15    139  |  103  |  53[H]  ----------------------------<  92  3.8   |  18[L]  |  2.88[H]    Ca    9.3      15 Robbin 2025 06:51    TPro  7.1  /  Alb  4.1  /  TBili  0.6  /  DBili  x   /  AST  25  /  ALT  15  /  AlkPhos  61  01-13    PT/INR - ( 13 Jan 2025 19:24 )   PT: 11.2 sec;   INR: 0.97 ratio         PTT - ( 13 Jan 2025 19:24 )  PTT:32.6 sec      Urinalysis Basic - ( 15 Robbin 2025 06:51 )    Color: x / Appearance: x / SG: x / pH: x  Gluc: 92 mg/dL / Ketone: x  / Bili: x / Urobili: x   Blood: x / Protein: x / Nitrite: x   Leuk Esterase: x / RBC: x / WBC x   Sq Epi: x / Non Sq Epi: x / Bacteria: x          RADIOLOGY & ADDITIONAL TESTS:  Results Reviewed:   Imaging Personally Reviewed:  Electrocardiogram Personally Reviewed:    COORDINATION OF CARE:  Care Discussed with Consultants/Other Providers [Y/N]:  Prior or Outpatient Records Reviewed [Y/N]:

## 2025-01-15 NOTE — CONSULT NOTE ADULT - SUBJECTIVE AND OBJECTIVE BOX
DATE OF SERVICE: 01-15-25 @ 10:42    CHIEF COMPLAINT:Patient is a 64y old  Male who presents with a chief complaint of sob, orthopnea (14 Jan 2025 09:43)      HISTORY OF PRESENT ILLNESS:  65 y/o M w/ PMH of CKD, HTN, HLD, ?CHF, current smoker, presenting w/ abnormal EKG from PCP office. States he has had heart failure in the past, was admitted to the hospital in 2020 for it and since has been stable. Also had angiogram at that time, reportedly normal. Went to PCP yesterday because he'd been experiencing slowly progressive MORALES and orthopnea over the past few weeks to months. PCP did an EKG and urged patient to come to ED due to abnormalities. Pt is unsure what they are. States he provided the ED with a copy of the abnormal EKG. ED documentation notes pt was sent in for abnormal EKG and proceeds to not include anything about the OP EKG interpretation. Just notes patient came in with HTN crisis and PIO for which they gave him BP meds and lasix. At present, patient resting comfortably in stretcher on his side, feels comfortable, no cp/palpitations, no sob/cough, no HA/vision problems.    In ED: Given home bp meds coreg, nifedipine, and lasix (14 Jan 2025 09:43)      PAST MEDICAL & SURGICAL HISTORY:  Hypertension      History of CHF (congestive heart failure)      Enlarged prostate              MEDICATIONS:  aspirin enteric coated 81 milliGRAM(s) Oral daily  carvedilol 12.5 milliGRAM(s) Oral every 12 hours  furosemide   Injectable 40 milliGRAM(s) IV Push daily  hydrALAZINE Injectable 15 milliGRAM(s) IV Push every 4 hours PRN  NIFEdipine XL 60 milliGRAM(s) Oral daily            atorvastatin 20 milliGRAM(s) Oral at bedtime        FAMILY HISTORY:  Family history of stroke        Non-contributory    SOCIAL HISTORY:    [+ ] Tobacco  [ ] Drugs  [ ] Alcohol    Allergies    No Known Allergies    Intolerances    	    REVIEW OF SYSTEMS:  CONSTITUTIONAL: No fever  EYES: No eye pain, visual disturbances, or discharge  ENMT:  No difficulty hearing, tinnitus  NECK: No pain or stiffness  RESPIRATORY: No cough, wheezing, + SOB  CARDIOVASCULAR: No chest pain, palpitations, passing out, dizziness, or leg swelling  GASTROINTESTINAL:  No nausea, vomiting, diarrhea or constipation. No melena.  GENITOURINARY: No dysuria, hematuria  NEUROLOGICAL: No stroke like symptoms  SKIN: No burning or lesions   ENDOCRINE: No heat or cold intolerance  MUSCULOSKELETAL: No joint pain or swelling  PSYCHIATRIC: No  anxiety, mood swings  HEME/LYMPH: No bleeding gums  ALLERGY AND IMMUNOLOGIC: No hives or eczema	    All other ROS negative    PHYSICAL EXAM:  T(C): 37 (01-15-25 @ 03:52), Max: 37.1 (01-14-25 @ 16:27)  HR: 79 (01-15-25 @ 06:03) (60 - 85)  BP: 164/89 (01-15-25 @ 06:03) (148/78 - 198/96)  RR: 18 (01-15-25 @ 03:52) (17 - 18)  SpO2: 97% (01-15-25 @ 03:52) (95% - 98%)  Wt(kg): --  I&O's Summary      Appearance: Normal	  HEENT:   Normal oral mucosa, EOMI	  Cardiovascular:  S1 S2, No JVD,    Respiratory: Lungs clear to auscultation	  Psychiatry: Alert  Gastrointestinal:  Soft, Non-tender, + BS	  Skin: No rashes   Neurologic: Non-focal  Extremities:  No edema  Vascular: Peripheral pulses palpable    	    	  	  CARDIAC MARKERS:  Labs personally reviewed by me                                  14.7   8.76  )-----------( 227      ( 14 Jan 2025 10:37 )             46.8     01-15    139  |  103  |  53[H]  ----------------------------<  92  3.8   |  18[L]  |  2.88[H]    Ca    9.3      15 Robbin 2025 06:51    TPro  7.1  /  Alb  4.1  /  TBili  0.6  /  DBili  x   /  AST  25  /  ALT  15  /  AlkPhos  61  01-13          EKG: Personally reviewed by me - SR   Radiology: Personally reviewed by me -   < from: Xray Chest 2 Views PA/Lat (01.13.25 @ 21:22) >    IMPRESSION:  Clear lungs.  Cardiomegaly.    < end of copied text >  < from: TTE W or WO Ultrasound Enhancing Agent (01.14.25 @ 15:29) >      1. Left ventricular cavity is normal in size. Left ventricular systolic function is normal with an ejection fraction of 65 % by Shook's method of disks. There are no regional wall motion abnormalities seen.   2. There is mild (grade 1) left ventricular diastolic dysfunction, with normal left ventricular filling pressure.   3. Normal right ventricular cavity size, with increased wall thickness, and normal right ventricular systolic function. Tricuspid annular plane systolic excursion (TAPSE) is 2.2 cm (normal >=1.7 cm).   4. Compared to the transthoracic echocardiogram performed on 1/11/2019, Aortic stenosis has progressed. LVH has progressed. A pericardial effusion was noted on prior echo.   5.Severe left ventricular hypertrophy.   6. Small pericardial effusion with no echocardiographic evidence of tamponade physiology.   7. There is increased LV mass and concentric hypertrophy.   8. Trileaflet aortic valve with reduced systolic excursion.There is mild calcification of the aortic valve leaflets. Moderate aortic stenosis.    < end of copied text >  < from: MR Cardiac No Cont (01.14.19 @ 12:59) >  1.  Concentric thickening of the left ventricular myocardium is likely   extending to the underlying hypertensionand reported history of aortic   valve stenosis.  2.  Subtle foci of late gadolinium enhancement along the anterior and   anteroseptal wall at the mid cavity. This could be secondary to altered   mechanics of underlying aortic stenosis or alternatively prior myocardial   infarction, though the latter is thought to be less likely.  3.  Aortic stenosis, not quantified.  4.  Phase contrast images were not obtained to quantify or evaluate   aortic stenosis. If these are desired, the patient and return for   additional phase-contrast imaging.  5.  The findings were discussed with MERRY Zelaya.        Assessment /Plan:     65 y/o M w/ PMH of CKD, HTN, HLD, ?CHF, current smoker, presenting w/ abnormal EKG from PCP office. States he has had heart failure in the past, was admitted to the hospital in 2020 for it and since has been stable. Also had angiogram at that time, reportedly normal. Went to PCP yesterday because he'd been experiencing slowly progressive MORALES and orthopnea over the past few weeks to months. PCP did an EKG and urged patient to come to ED due to abnormalities. Pt is unsure what they are. States he provided the ED with a copy of the abnormal EKG. ED documentation notes pt was sent in for abnormal EKG and proceeds to not include anything about the OP EKG interpretation. Just notes patient came in with HTN crisis and PIO for which they gave him BP meds and lasix. At present, patient resting comfortably in stretcher on his side, feels comfortable, no cp/palpitations, no sob/cough, no HA/vision problems.    Problem/Plan #1:     Differential diagnosis and plan of care discussed with patient after the evaluation. Counseling on diet, nutritional counseling, weight management, exercise and medication compliance was done.   Advanced care planning/advanced directives discussed with patient/family. DNR status including forceful chest compressions to attempt to restart the heart, ventilator support/artificial breathing, electric shock, artificial nutrition, health care proxy, Molst form all discussed with pt. Pt wishes to consider. More than fifteen minutes spent on discussing advanced directives.       Jin Alva DO [facility]  Cardiovascular Medicine  [address], [address]  Available for call or text via [employer]  Office [phone]   DATE OF SERVICE: 01-15-25 @ 10:42    CHIEF COMPLAINT:Patient is a 64y old  Male who presents with a chief complaint of sob, orthopnea (14 Jan 2025 09:43)      HISTORY OF PRESENT ILLNESS:  63 y/o M w/ PMH of CKD, HTN, HLD, ?CHF, current smoker, presenting w/ abnormal EKG from PCP office. States he has had heart failure in the past, was admitted to the hospital in 2020 for it and since has been stable. Also had angiogram at that time, reportedly normal. Went to PCP yesterday because he'd been experiencing slowly progressive MORALES and orthopnea over the past few weeks to months. PCP did an EKG and urged patient to come to ED due to abnormalities. Pt is unsure what they are. States he provided the ED with a copy of the abnormal EKG. ED documentation notes pt was sent in for abnormal EKG and proceeds to not include anything about the OP EKG interpretation. Just notes patient came in with HTN crisis and PIO for which they gave him BP meds and lasix. At present, patient resting comfortably in stretcher on his side, feels comfortable, no cp/palpitations, no sob/cough, no HA/vision problems.    In ED: Given home bp meds coreg, nifedipine, and lasix (14 Jan 2025 09:43)      PAST MEDICAL & SURGICAL HISTORY:  Hypertension      History of CHF (congestive heart failure)      Enlarged prostate              MEDICATIONS:  aspirin enteric coated 81 milliGRAM(s) Oral daily  carvedilol 12.5 milliGRAM(s) Oral every 12 hours  furosemide   Injectable 40 milliGRAM(s) IV Push daily  hydrALAZINE Injectable 15 milliGRAM(s) IV Push every 4 hours PRN  NIFEdipine XL 60 milliGRAM(s) Oral daily            atorvastatin 20 milliGRAM(s) Oral at bedtime        FAMILY HISTORY:  Family history of stroke        Non-contributory    SOCIAL HISTORY:    [+ ] Tobacco  [ ] Drugs  [ ] Alcohol    Allergies    No Known Allergies    Intolerances    	    REVIEW OF SYSTEMS:  CONSTITUTIONAL: No fever  EYES: No eye pain, visual disturbances, or discharge  ENMT:  No difficulty hearing, tinnitus  NECK: No pain or stiffness  RESPIRATORY: No cough, wheezing, + SOB  CARDIOVASCULAR: No chest pain, palpitations, passing out, dizziness, or leg swelling  GASTROINTESTINAL:  No nausea, vomiting, diarrhea or constipation. No melena.  GENITOURINARY: No dysuria, hematuria  NEUROLOGICAL: No stroke like symptoms  SKIN: No burning or lesions   ENDOCRINE: No heat or cold intolerance  MUSCULOSKELETAL: No joint pain or swelling  PSYCHIATRIC: No  anxiety, mood swings  HEME/LYMPH: No bleeding gums  ALLERGY AND IMMUNOLOGIC: No hives or eczema	    All other ROS negative    PHYSICAL EXAM:  T(C): 37 (01-15-25 @ 03:52), Max: 37.1 (01-14-25 @ 16:27)  HR: 79 (01-15-25 @ 06:03) (60 - 85)  BP: 164/89 (01-15-25 @ 06:03) (148/78 - 198/96)  RR: 18 (01-15-25 @ 03:52) (17 - 18)  SpO2: 97% (01-15-25 @ 03:52) (95% - 98%)  Wt(kg): --  I&O's Summary      Appearance: Normal	  HEENT:   Normal oral mucosa, EOMI	  Cardiovascular:  S1 S2, + JVD,  + murmur  Respiratory: Lungs clear to auscultation	  Psychiatry: Alert  Gastrointestinal:  Soft, Non-tender, + BS	  Skin: No rashes   Neurologic: Non-focal  Extremities:  No edema  Vascular: Peripheral pulses palpable    	    	  	  CARDIAC MARKERS:  Labs personally reviewed by me                                  14.7   8.76  )-----------( 227      ( 14 Jan 2025 10:37 )             46.8     01-15    139  |  103  |  53[H]  ----------------------------<  92  3.8   |  18[L]  |  2.88[H]    Ca    9.3      15 Robbin 2025 06:51    TPro  7.1  /  Alb  4.1  /  TBili  0.6  /  DBili  x   /  AST  25  /  ALT  15  /  AlkPhos  61  01-13          EKG: Personally reviewed by me - SR LAFB twi I, aVL  Radiology: Personally reviewed by me -   < from: Xray Chest 2 Views PA/Lat (01.13.25 @ 21:22) >    IMPRESSION:  Clear lungs.  Cardiomegaly.    < end of copied text >  < from: TTE W or WO Ultrasound Enhancing Agent (01.14.25 @ 15:29) >      1. Left ventricular cavity is normal in size. Left ventricular systolic function is normal with an ejection fraction of 65 % by Shook's method of disks. There are no regional wall motion abnormalities seen.   2. There is mild (grade 1) left ventricular diastolic dysfunction, with normal left ventricular filling pressure.   3. Normal right ventricular cavity size, with increased wall thickness, and normal right ventricular systolic function. Tricuspid annular plane systolic excursion (TAPSE) is 2.2 cm (normal >=1.7 cm).   4. Compared to the transthoracic echocardiogram performed on 1/11/2019, Aortic stenosis has progressed. LVH has progressed. A pericardial effusion was noted on prior echo.   5.Severe left ventricular hypertrophy.   6. Small pericardial effusion with no echocardiographic evidence of tamponade physiology.   7. There is increased LV mass and concentric hypertrophy.   8. Trileaflet aortic valve with reduced systolic excursion.There is mild calcification of the aortic valve leaflets. Moderate aortic stenosis.    < end of copied text >  < from: MR Cardiac No Cont (01.14.19 @ 12:59) >  1.  Concentric thickening of the left ventricular myocardium is likely   extending to the underlying hypertensionand reported history of aortic   valve stenosis.  2.  Subtle foci of late gadolinium enhancement along the anterior and   anteroseptal wall at the mid cavity. This could be secondary to altered   mechanics of underlying aortic stenosis or alternatively prior myocardial   infarction, though the latter is thought to be less likely.  3.  Aortic stenosis, not quantified.  4.  Phase contrast images were not obtained to quantify or evaluate   aortic stenosis. If these are desired, the patient and return for   additional phase-contrast imaging.          ca< from: Cardiac Cath Lab - Adult (01.11.19 @ 16:56) >  LM:   --  LM: Normal.  LAD:   --  LAD: Normal.  CX:   --  Circumflex: Normal.  RCA:   --  Mid RCA: There was a 30 % stenosis.  COMPLICATIONS: There were no complications.      Assessment /Plan:     63 y/o M w/ PMH of CKD, HTN, HLD, ?CHF, current smoker, presenting w/ abnormal EKG from PCP office. States he has had heart failure in the past, was admitted to the hospital in 2020 for it and since has been stable. Also had angiogram at that time, reportedly normal. Went to PCP yesterday because he'd been experiencing slowly progressive MORALES and orthopnea over the past few weeks to months. PCP did an EKG and urged patient to come to ED due to abnormalities. Pt is unsure what they are. States he provided the ED with a copy of the abnormal EKG. ED documentation notes pt was sent in for abnormal EKG and proceeds to not include anything about the OP EKG interpretation. Just notes patient came in with HTN crisis and PIO for which they gave him BP meds and lasix. At present, patient resting comfortably in stretcher on his side, feels comfortable, no cp/palpitations, no sob/cough, no HA/vision problems.    Problem/Plan #1: Shortness of Breath  - CXR with clear lungs, cardiomegaly  - BNP 5821  - TTE shows EF 65%, no RWMA, normal LV fillings pressures, severe LVH, moderate AS  - Cath from 2019 with mild, non-obstructive CAD of RCA  - Trop neg  - Symptoms likely 2/2 ADHF i/s/o moderate AS  - c/w Lasix 40mg IVD. Patient feeling significant improvement in symptoms thus far.   - Monitor strict I&Os, daily weights    Problem/Plan #2: Aortic Stenosis  - Noted as moderate on TTE  - c/w OP surveillance    Problem/Plan #3: Hypertension  - c/w carvedilol 12.5mg PO BIDh  - Nifedipine increased to 60mg PO daily    Problem/Plan #4: HLD  - c/w atorvastatin 20mg PO daily    Differential diagnosis and plan of care discussed with patient after the evaluation. Counseling on diet, nutritional counseling, weight management, exercise and medication compliance was done.   Advanced care planning/advanced directives discussed with patient/family. DNR status including forceful chest compressions to attempt to restart the heart, ventilator support/artificial breathing, electric shock, artificial nutrition, health care proxy, Molst form all discussed with pt. Pt wishes to consider. More than fifteen minutes spent on discussing advanced directives.     Davida Avila [facility]  Jin Alva DO [facility]  Cardiovascular Medicine  [address], [address]  Available for call or text via [employer]  Office [phone]   DATE OF SERVICE: 01-15-25 @ 10:42    CHIEF COMPLAINT:Patient is a 64y old  Male who presents with a chief complaint of sob, orthopnea (14 Jan 2025 09:43)      HISTORY OF PRESENT ILLNESS:  63 y/o M w/ PMH of CKD, HTN, HLD, ?CHF, current smoker, presenting w/ abnormal EKG from PCP office. States he has had heart failure in the past, was admitted to the hospital in 2020 for it and since has been stable. Also had angiogram at that time, reportedly normal. Went to PCP yesterday because he'd been experiencing slowly progressive MORALES and orthopnea over the past few weeks to months. PCP did an EKG and urged patient to come to ED due to abnormalities. Pt is unsure what they are. States he provided the ED with a copy of the abnormal EKG. ED documentation notes pt was sent in for abnormal EKG and proceeds to not include anything about the OP EKG interpretation. Just notes patient came in with HTN crisis and PIO for which they gave him BP meds and lasix. At present, patient resting comfortably in stretcher on his side, feels comfortable, no cp/palpitations, no sob/cough, no HA/vision problems.    In ED: Given home bp meds coreg, nifedipine, and lasix (14 Jan 2025 09:43)      PAST MEDICAL & SURGICAL HISTORY:  Hypertension      History of CHF (congestive heart failure)      Enlarged prostate              MEDICATIONS:  aspirin enteric coated 81 milliGRAM(s) Oral daily  carvedilol 12.5 milliGRAM(s) Oral every 12 hours  furosemide   Injectable 40 milliGRAM(s) IV Push daily  hydrALAZINE Injectable 15 milliGRAM(s) IV Push every 4 hours PRN  NIFEdipine XL 60 milliGRAM(s) Oral daily            atorvastatin 20 milliGRAM(s) Oral at bedtime        FAMILY HISTORY:  Family history of stroke        Non-contributory    SOCIAL HISTORY:    [+ ] Tobacco  [ ] Drugs  [ ] Alcohol    Allergies    No Known Allergies    Intolerances    	    REVIEW OF SYSTEMS:  CONSTITUTIONAL: No fever  EYES: No eye pain, visual disturbances, or discharge  ENMT:  No difficulty hearing, tinnitus  NECK: No pain or stiffness  RESPIRATORY: No cough, wheezing, + SOB  CARDIOVASCULAR: No chest pain, palpitations, passing out, dizziness, or leg swelling  GASTROINTESTINAL:  No nausea, vomiting, diarrhea or constipation. No melena.  GENITOURINARY: No dysuria, hematuria  NEUROLOGICAL: No stroke like symptoms  SKIN: No burning or lesions   ENDOCRINE: No heat or cold intolerance  MUSCULOSKELETAL: No joint pain or swelling  PSYCHIATRIC: No  anxiety, mood swings  HEME/LYMPH: No bleeding gums  ALLERGY AND IMMUNOLOGIC: No hives or eczema	    All other ROS negative    PHYSICAL EXAM:  T(C): 37 (01-15-25 @ 03:52), Max: 37.1 (01-14-25 @ 16:27)  HR: 79 (01-15-25 @ 06:03) (60 - 85)  BP: 164/89 (01-15-25 @ 06:03) (148/78 - 198/96)  RR: 18 (01-15-25 @ 03:52) (17 - 18)  SpO2: 97% (01-15-25 @ 03:52) (95% - 98%)  Wt(kg): --  I&O's Summary      Appearance: Normal	  HEENT:   Normal oral mucosa, EOMI	  Cardiovascular:  S1 S2, + JVD,  + murmur  Respiratory: Lungs clear to auscultation	  Psychiatry: Alert  Gastrointestinal:  Soft, Non-tender, + BS	  Skin: No rashes   Neurologic: Non-focal  Extremities:  No edema  Vascular: Peripheral pulses palpable    	    	  	  CARDIAC MARKERS:  Labs personally reviewed by me                                  14.7   8.76  )-----------( 227      ( 14 Jan 2025 10:37 )             46.8     01-15    139  |  103  |  53[H]  ----------------------------<  92  3.8   |  18[L]  |  2.88[H]    Ca    9.3      15 Robbin 2025 06:51    TPro  7.1  /  Alb  4.1  /  TBili  0.6  /  DBili  x   /  AST  25  /  ALT  15  /  AlkPhos  61  01-13          EKG: Personally reviewed by me - SR LAFB twi I, aVL  Radiology: Personally reviewed by me -   < from: Xray Chest 2 Views PA/Lat (01.13.25 @ 21:22) >    IMPRESSION:  Clear lungs.  Cardiomegaly.    < end of copied text >  < from: TTE W or WO Ultrasound Enhancing Agent (01.14.25 @ 15:29) >      1. Left ventricular cavity is normal in size. Left ventricular systolic function is normal with an ejection fraction of 65 % by Shook's method of disks. There are no regional wall motion abnormalities seen.   2. There is mild (grade 1) left ventricular diastolic dysfunction, with normal left ventricular filling pressure.   3. Normal right ventricular cavity size, with increased wall thickness, and normal right ventricular systolic function. Tricuspid annular plane systolic excursion (TAPSE) is 2.2 cm (normal >=1.7 cm).   4. Compared to the transthoracic echocardiogram performed on 1/11/2019, Aortic stenosis has progressed. LVH has progressed. A pericardial effusion was noted on prior echo.   5.Severe left ventricular hypertrophy.   6. Small pericardial effusion with no echocardiographic evidence of tamponade physiology.   7. There is increased LV mass and concentric hypertrophy.   8. Trileaflet aortic valve with reduced systolic excursion.There is mild calcification of the aortic valve leaflets. Moderate aortic stenosis.    < end of copied text >  < from: MR Cardiac No Cont (01.14.19 @ 12:59) >  1.  Concentric thickening of the left ventricular myocardium is likely   extending to the underlying hypertensionand reported history of aortic   valve stenosis.  2.  Subtle foci of late gadolinium enhancement along the anterior and   anteroseptal wall at the mid cavity. This could be secondary to altered   mechanics of underlying aortic stenosis or alternatively prior myocardial   infarction, though the latter is thought to be less likely.  3.  Aortic stenosis, not quantified.  4.  Phase contrast images were not obtained to quantify or evaluate   aortic stenosis. If these are desired, the patient and return for   additional phase-contrast imaging.          ca< from: Cardiac Cath Lab - Adult (01.11.19 @ 16:56) >  LM:   --  LM: Normal.  LAD:   --  LAD: Normal.  CX:   --  Circumflex: Normal.  RCA:   --  Mid RCA: There was a 30 % stenosis.  COMPLICATIONS: There were no complications.      Assessment /Plan:     63 y/o M w/ PMH of CKD, HTN, HLD, ?CHF, current smoker, presenting w/ abnormal EKG from PCP office. States he has had heart failure in the past, was admitted to the hospital in 2020 for it and since has been stable. Also had angiogram at that time, reportedly normal. Went to PCP yesterday because he'd been experiencing slowly progressive MORALES and orthopnea over the past few weeks to months. PCP did an EKG and urged patient to come to ED due to abnormalities. Pt is unsure what they are. States he provided the ED with a copy of the abnormal EKG. ED documentation notes pt was sent in for abnormal EKG and proceeds to not include anything about the OP EKG interpretation. Just notes patient came in with HTN crisis and PIO for which they gave him BP meds and lasix. At present, patient resting comfortably in stretcher on his side, feels comfortable, no cp/palpitations, no sob/cough, no HA/vision problems.    Problem/Plan #1: Shortness of Breath  - CXR with clear lungs, cardiomegaly  - BNP 5821  - TTE shows EF 65%, no RWMA, normal LV fillings pressures, severe LVH, moderate AS  - Cath from 2019 with mild, non-obstructive CAD of RCA  - Trop neg  - Symptoms likely 2/2 ADHF i/s/o moderate AS  - c/w Lasix 40mg IVD. Patient feeling significant improvement in symptoms thus far.   - Monitor strict I&Os, daily weights  - Plan for transition to PO Lasix tomorrow, 1/16  - CXR with clear lungs,m normal filling pressures on TTE and mildly elevated BNP. Check BNP tomorrow.    Problem/Plan #2: Aortic Stenosis  - Noted as moderate on TTE  - c/w OP surveillance    Problem/Plan #3: Hypertension  - c/w carvedilol 12.5mg PO BIDh  - Nifedipine increased to 60mg PO daily    Problem/Plan #4: HLD  - c/w atorvastatin 20mg PO daily    Differential diagnosis and plan of care discussed with patient after the evaluation. Counseling on diet, nutritional counseling, weight management, exercise and medication compliance was done.   Advanced care planning/advanced directives discussed with patient/family. DNR status including forceful chest compressions to attempt to restart the heart, ventilator support/artificial breathing, electric shock, artificial nutrition, health care proxy, Molst form all discussed with pt. Pt wishes to consider. More than fifteen minutes spent on discussing advanced directives.     Davida GUTIÉRREZ-BC  Jin Alva DO [facility]  Cardiovascular Medicine  [address], [address]  Available for call or text via [employer]  Office [phone]   DATE OF SERVICE: 01-15-25 @ 10:42    CHIEF COMPLAINT:Patient is a 64y old  Male who presents with a chief complaint of sob, orthopnea (14 Jan 2025 09:43)      HISTORY OF PRESENT ILLNESS:  63 y/o M w/ PMH of CKD, HTN, HLD, ?CHF, current smoker, presenting w/ abnormal EKG from PCP office. States he has had heart failure in the past, was admitted to the hospital in 2020 for it and since has been stable. Also had angiogram at that time, reportedly normal. Went to PCP yesterday because he'd been experiencing slowly progressive MORALES and orthopnea over the past few weeks to months. PCP did an EKG and urged patient to come to ED due to abnormalities. Pt is unsure what they are. States he provided the ED with a copy of the abnormal EKG. ED documentation notes pt was sent in for abnormal EKG and proceeds to not include anything about the OP EKG interpretation. Just notes patient came in with HTN crisis and PIO for which they gave him BP meds and lasix. At present, patient resting comfortably in stretcher on his side, feels comfortable, no cp/palpitations, no sob/cough, no HA/vision problems.    In ED: Given home bp meds coreg, nifedipine, and lasix (14 Jan 2025 09:43)      PAST MEDICAL & SURGICAL HISTORY:  Hypertension      History of CHF (congestive heart failure)      Enlarged prostate              MEDICATIONS:  aspirin enteric coated 81 milliGRAM(s) Oral daily  carvedilol 12.5 milliGRAM(s) Oral every 12 hours  furosemide   Injectable 40 milliGRAM(s) IV Push daily  hydrALAZINE Injectable 15 milliGRAM(s) IV Push every 4 hours PRN  NIFEdipine XL 60 milliGRAM(s) Oral daily            atorvastatin 20 milliGRAM(s) Oral at bedtime        FAMILY HISTORY:  Family history of stroke        Non-contributory    SOCIAL HISTORY:    [+ ] Tobacco  [ ] Drugs  [ ] Alcohol    Allergies    No Known Allergies    Intolerances    	    REVIEW OF SYSTEMS:  CONSTITUTIONAL: No fever  EYES: No eye pain, visual disturbances, or discharge  ENMT:  No difficulty hearing, tinnitus  NECK: No pain or stiffness  RESPIRATORY: No cough, wheezing, + SOB  CARDIOVASCULAR: No chest pain, palpitations, passing out, dizziness, or leg swelling  GASTROINTESTINAL:  No nausea, vomiting, diarrhea or constipation. No melena.  GENITOURINARY: No dysuria, hematuria  NEUROLOGICAL: No stroke like symptoms  SKIN: No burning or lesions   ENDOCRINE: No heat or cold intolerance  MUSCULOSKELETAL: No joint pain or swelling  PSYCHIATRIC: No  anxiety, mood swings  HEME/LYMPH: No bleeding gums  ALLERGY AND IMMUNOLOGIC: No hives or eczema	    All other ROS negative    PHYSICAL EXAM:  T(C): 37 (01-15-25 @ 03:52), Max: 37.1 (01-14-25 @ 16:27)  HR: 79 (01-15-25 @ 06:03) (60 - 85)  BP: 164/89 (01-15-25 @ 06:03) (148/78 - 198/96)  RR: 18 (01-15-25 @ 03:52) (17 - 18)  SpO2: 97% (01-15-25 @ 03:52) (95% - 98%)  Wt(kg): --  I&O's Summary      Appearance: Normal	  HEENT:   Normal oral mucosa, EOMI	  Cardiovascular:  S1 S2, + JVD,  + murmur  Respiratory: Lungs clear to auscultation	  Psychiatry: Alert  Gastrointestinal:  Soft, Non-tender, + BS	  Skin: No rashes   Neurologic: Non-focal  Extremities:  No edema  Vascular: Peripheral pulses palpable    	    	  	  CARDIAC MARKERS:  Labs personally reviewed by me                                  14.7   8.76  )-----------( 227      ( 14 Jan 2025 10:37 )             46.8     01-15    139  |  103  |  53[H]  ----------------------------<  92  3.8   |  18[L]  |  2.88[H]    Ca    9.3      15 Robbin 2025 06:51    TPro  7.1  /  Alb  4.1  /  TBili  0.6  /  DBili  x   /  AST  25  /  ALT  15  /  AlkPhos  61  01-13          EKG: Personally reviewed by me - SR LAFB twi I, aVL  Radiology: Personally reviewed by me -   < from: Xray Chest 2 Views PA/Lat (01.13.25 @ 21:22) >    IMPRESSION:  Clear lungs.  Cardiomegaly.    < end of copied text >  < from: TTE W or WO Ultrasound Enhancing Agent (01.14.25 @ 15:29) >      1. Left ventricular cavity is normal in size. Left ventricular systolic function is normal with an ejection fraction of 65 % by Shook's method of disks. There are no regional wall motion abnormalities seen.   2. There is mild (grade 1) left ventricular diastolic dysfunction, with normal left ventricular filling pressure.   3. Normal right ventricular cavity size, with increased wall thickness, and normal right ventricular systolic function. Tricuspid annular plane systolic excursion (TAPSE) is 2.2 cm (normal >=1.7 cm).   4. Compared to the transthoracic echocardiogram performed on 1/11/2019, Aortic stenosis has progressed. LVH has progressed. A pericardial effusion was noted on prior echo.   5.Severe left ventricular hypertrophy.   6. Small pericardial effusion with no echocardiographic evidence of tamponade physiology.   7. There is increased LV mass and concentric hypertrophy.   8. Trileaflet aortic valve with reduced systolic excursion.There is mild calcification of the aortic valve leaflets. Moderate aortic stenosis.    < end of copied text >  < from: MR Cardiac No Cont (01.14.19 @ 12:59) >  1.  Concentric thickening of the left ventricular myocardium is likely   extending to the underlying hypertensionand reported history of aortic   valve stenosis.  2.  Subtle foci of late gadolinium enhancement along the anterior and   anteroseptal wall at the mid cavity. This could be secondary to altered   mechanics of underlying aortic stenosis or alternatively prior myocardial   infarction, though the latter is thought to be less likely.  3.  Aortic stenosis, not quantified.  4.  Phase contrast images were not obtained to quantify or evaluate   aortic stenosis. If these are desired, the patient and return for   additional phase-contrast imaging.          ca< from: Cardiac Cath Lab - Adult (01.11.19 @ 16:56) >  LM:   --  LM: Normal.  LAD:   --  LAD: Normal.  CX:   --  Circumflex: Normal.  RCA:   --  Mid RCA: There was a 30 % stenosis.  COMPLICATIONS: There were no complications.            Assessment /Plan:     63 y/o M w/ PMH of CKD, HTN, HLD, ?CHF, current smoker, presenting w/ abnormal EKG from PCP office. States he has had heart failure in the past, was admitted to the hospital in 2020 for it and since has been stable. Also had angiogram at that time, reportedly normal. Went to PCP yesterday because he'd been experiencing slowly progressive MORALES and orthopnea over the past few weeks to months. PCP did an EKG and urged patient to come to ED due to abnormalities. Pt is unsure what they are. States he provided the ED with a copy of the abnormal EKG. ED documentation notes pt was sent in for abnormal EKG and proceeds to not include anything about the OP EKG interpretation. Just notes patient came in with HTN crisis and PIO for which they gave him BP meds and lasix. At present, patient resting comfortably in stretcher on his side, feels comfortable, no cp/palpitations, no sob/cough, no HA/vision problems.    Problem/Plan #1: Shortness of Breath  - CXR with clear lungs, cardiomegaly  - BNP 5821  - TTE shows EF 65%, no RWMA, normal LV fillings pressures, severe LVH, moderate AS  - Cath from 2019 with mild, non-obstructive CAD of RCA  - Trop neg  - Symptoms likely 2/2 ADHF i/s/o moderate AS  - Switch to Lasix 40mg PO qd, s/p Lasix 40mg IV qD. Patient feeling significant improvement in symptoms thus far.   - Monitor strict I&Os, daily weights  - CXR with clear lungs, normal filling pressures on TTE and mildly elevated BNP. Check BNP tomorrow.    Problem/Plan #2: Aortic Stenosis  - Noted as moderate on TTE  - c/w OP surveillance    Problem/Plan #3: Hypertension  - c/w carvedilol 12.5mg PO BIDh  - Nifedipine increased to 60mg PO daily    Problem/Plan #4: HLD  - c/w atorvastatin 20mg PO daily          Differential diagnosis and plan of care discussed with patient after the evaluation. Counseling on diet, nutritional counseling, weight management, exercise and medication compliance was done.   Advanced care planning/advanced directives discussed with patient/family. DNR status including forceful chest compressions to attempt to restart the heart, ventilator support/artificial breathing, electric shock, artificial nutrition, health care proxy, Molst form all discussed with pt. Pt wishes to consider. More than fifteen minutes spent on discussing advanced directives.     Davida GUTIÉRREZ-CHANTEL Alva DO [facility]  Cardiovascular Medicine  [address], [address]  Available for call or text via [employer]  Office [phone]

## 2025-01-16 ENCOUNTER — TRANSCRIPTION ENCOUNTER (OUTPATIENT)
Age: 65
End: 2025-01-16

## 2025-01-16 LAB
ANION GAP SERPL CALC-SCNC: 13 MMOL/L — SIGNIFICANT CHANGE UP (ref 5–17)
ANION GAP SERPL CALC-SCNC: 14 MMOL/L — SIGNIFICANT CHANGE UP (ref 5–17)
BUN SERPL-MCNC: 54 MG/DL — HIGH (ref 7–23)
BUN SERPL-MCNC: 55 MG/DL — HIGH (ref 7–23)
CALCIUM SERPL-MCNC: 8.6 MG/DL — SIGNIFICANT CHANGE UP (ref 8.4–10.5)
CALCIUM SERPL-MCNC: 8.7 MG/DL — SIGNIFICANT CHANGE UP (ref 8.4–10.5)
CHLORIDE SERPL-SCNC: 104 MMOL/L — SIGNIFICANT CHANGE UP (ref 96–108)
CHLORIDE SERPL-SCNC: 104 MMOL/L — SIGNIFICANT CHANGE UP (ref 96–108)
CO2 SERPL-SCNC: 20 MMOL/L — LOW (ref 22–31)
CO2 SERPL-SCNC: 22 MMOL/L — SIGNIFICANT CHANGE UP (ref 22–31)
CREAT SERPL-MCNC: 2.92 MG/DL — HIGH (ref 0.5–1.3)
CREAT SERPL-MCNC: 3.35 MG/DL — HIGH (ref 0.5–1.3)
EGFR: 20 ML/MIN/1.73M2 — LOW
EGFR: 24 ML/MIN/1.73M2 — LOW
GLUCOSE SERPL-MCNC: 118 MG/DL — HIGH (ref 70–99)
GLUCOSE SERPL-MCNC: 98 MG/DL — SIGNIFICANT CHANGE UP (ref 70–99)
MAGNESIUM SERPL-MCNC: 2.1 MG/DL — SIGNIFICANT CHANGE UP (ref 1.6–2.6)
POTASSIUM SERPL-MCNC: 3.8 MMOL/L — SIGNIFICANT CHANGE UP (ref 3.5–5.3)
POTASSIUM SERPL-MCNC: 4.3 MMOL/L — SIGNIFICANT CHANGE UP (ref 3.5–5.3)
POTASSIUM SERPL-SCNC: 3.8 MMOL/L — SIGNIFICANT CHANGE UP (ref 3.5–5.3)
POTASSIUM SERPL-SCNC: 4.3 MMOL/L — SIGNIFICANT CHANGE UP (ref 3.5–5.3)
SODIUM SERPL-SCNC: 138 MMOL/L — SIGNIFICANT CHANGE UP (ref 135–145)
SODIUM SERPL-SCNC: 139 MMOL/L — SIGNIFICANT CHANGE UP (ref 135–145)

## 2025-01-16 PROCEDURE — 76770 US EXAM ABDO BACK WALL COMP: CPT | Mod: 26

## 2025-01-16 PROCEDURE — 99232 SBSQ HOSP IP/OBS MODERATE 35: CPT

## 2025-01-16 RX ORDER — SODIUM CHLORIDE 9 MG/ML
1000 INJECTION, SOLUTION INTRAMUSCULAR; INTRAVENOUS; SUBCUTANEOUS
Refills: 0 | Status: DISCONTINUED | OUTPATIENT
Start: 2025-01-16 | End: 2025-01-17

## 2025-01-16 RX ADMIN — NICOTINE POLACRILEX 1 PATCH: 4 LOZENGE ORAL at 19:42

## 2025-01-16 RX ADMIN — NIFEDIPINE 60 MILLIGRAM(S): 60 TABLET, EXTENDED RELEASE ORAL at 05:15

## 2025-01-16 RX ADMIN — SODIUM CHLORIDE 75 MILLILITER(S): 9 INJECTION, SOLUTION INTRAMUSCULAR; INTRAVENOUS; SUBCUTANEOUS at 16:49

## 2025-01-16 RX ADMIN — ATORVASTATIN CALCIUM 20 MILLIGRAM(S): 40 TABLET, FILM COATED ORAL at 21:02

## 2025-01-16 RX ADMIN — NICOTINE POLACRILEX 1 PATCH: 4 LOZENGE ORAL at 11:40

## 2025-01-16 RX ADMIN — NICOTINE POLACRILEX 1 PATCH: 4 LOZENGE ORAL at 12:37

## 2025-01-16 RX ADMIN — Medication 81 MILLIGRAM(S): at 11:39

## 2025-01-16 RX ADMIN — SODIUM CHLORIDE 75 MILLILITER(S): 9 INJECTION, SOLUTION INTRAMUSCULAR; INTRAVENOUS; SUBCUTANEOUS at 19:25

## 2025-01-16 RX ADMIN — SODIUM CHLORIDE 75 MILLILITER(S): 9 INJECTION, SOLUTION INTRAMUSCULAR; INTRAVENOUS; SUBCUTANEOUS at 10:31

## 2025-01-16 RX ADMIN — LOSARTAN POTASSIUM 50 MILLIGRAM(S): 100 TABLET, FILM COATED ORAL at 05:14

## 2025-01-16 RX ADMIN — FLUTICASONE PROPIONATE AND SALMETEROL 1 DOSE(S): 50; 500 POWDER ORAL; RESPIRATORY (INHALATION) at 08:07

## 2025-01-16 RX ADMIN — CARVEDILOL 12.5 MILLIGRAM(S): 25 TABLET, FILM COATED ORAL at 16:50

## 2025-01-16 RX ADMIN — FLUTICASONE PROPIONATE AND SALMETEROL 1 DOSE(S): 50; 500 POWDER ORAL; RESPIRATORY (INHALATION) at 21:03

## 2025-01-16 RX ADMIN — NICOTINE POLACRILEX 1 PATCH: 4 LOZENGE ORAL at 10:33

## 2025-01-16 RX ADMIN — CARVEDILOL 12.5 MILLIGRAM(S): 25 TABLET, FILM COATED ORAL at 05:14

## 2025-01-16 NOTE — DISCHARGE NOTE PROVIDER - HOSPITAL COURSE
HPI:  65 y/o M w/ PMH of CKD, HTN, HLD, ?CHF, current smoker, presenting w/ abnormal EKG from PCP office. States he has had heart failure in the past, was admitted to the hospital in 2020 for it and since has been stable. Also had angiogram at that time, reportedly normal. Went to PCP yesterday because he'd been experiencing slowly progressive MORALES and orthopnea over the past few weeks to months. PCP did an EKG and urged patient to come to ED due to abnormalities. Pt is unsure what they are. States he provided the ED with a copy of the abnormal EKG. ED documentation notes pt was sent in for abnormal EKG and proceeds to not include anything about the OP EKG interpretation. Just notes patient came in with HTN crisis and PIO for which they gave him BP meds and lasix. At present, patient resting comfortably in stretcher on his side, feels comfortable, no cp/palpitations, no sob/cough, no HA/vision problems.    In ED: Given home bp meds coreg, nifedipine, and lasix (14 Jan 2025 09:43)    Hospital Course:  64m hx HTN, ?CHF, HLD, active smoker presenting from PCP office with abnormal EKG in the setting of chronic progressively worsening MORALES and orthopnea, concerning for mild ADHF    Problem/Plan - 1:  ·  Problem: Acute decompensated heart failure.   ·  Plan: Pt reports remote hx of hospitalization for HF and normal angiogram however is not currently on GDMT and is otherwise a poor historian. Has been having chronic progressively worsening MORALES and orthopnea as OP which prompted visit to PCP who sent him in for abnormal EKG but EKG no longer found.   - pt notes he lost cards f/u due to insurance change and hasn't seen anyone in years   - Last TTE in system 2019 with moderate AS, normal LV systolic function, grade I diastolic dysfunction  Repeat TTE with slight worse AS but preserve LV EF 65% , LVH.  - EKG here without any acute concerning findings, reached out to Dr. Hobson's office and cell [phone]   for further collateral, OP EKG showed new TWI in lateral leads, now resolved on our EKGs.  - tropes indeterminate, pro BNP is elevated but pt also has CKD  - previous provider heard wheezing but none appreciated today.   - no obvious s+s of decompensated HF ? more COPD exacerbation .  - Cr slight uptrended - will hold off futher lasix now .   - monitor strict i/o  card eval Dr. Alva called.     Problem/Plan - 2:  ·  Problem: COPD exacerbation.   ·  Plan: overall presentation possibly more concerning for COPD. no formal dx has not seen pulm   chronic smoker for 40 years half pack per day   on exam today no wheezing . per previous provider had exp wheezing.   will start on maintenance inhaler - advair twice a day.   will hold off steroid given current lack of symptoms.    outpt follow up with pulm for likely PFTs.     Problem/Plan - 3:  ·  Problem: HTN (hypertension).   ·  Plan: With htn crisis on presentation, now improved s/p BP meds and lasix  Per pt has not had good BP control as outpt , SBP mostly 170s office and home checks   previously on lisinopril but changed to losartan in Sept 2024   now on increase dose nifedipine here   will start back on losartan (pt with CKD per pt has had inc baseline Cr ~ 2.5 as outpt likely sec to uncontrolled BP).     Problem/Plan - 4:  ·  Problem: Stage 2 chronic kidney disease.   ·  Plan: suspect not PIO more CKD- per pt has had increased renal function Cr ~ 2.5 as he recalls. told he need to see renal.   would hold of further lasix given lack of significant overload on exam and imaging . Cr also increased  can resume losartan given uncontrolled BP.     Problem/Plan - 5:  ·  Problem: Smoker.   ·  Plan: nicotine patch    Advanced Directives:   [X] Full code  [ ] DNR  [ ] Hospice    Discharge Diagnoses:  (Admit Diagnosis) Shortness of breath  (PMH) Enlarged prostate  (PMH) History of CHF (congestive heart failure)  (PMH) Hypertension  (Principal DC/DX) SOB (shortness of breath)  (Problem/DX) COPD exacerbation  (Problem/DX) Smoker  (Problem/DX) Stage 2 chronic kidney disease  (Problem/DX) HLD (hyperlipidemia)  (Problem/DX) HTN (hypertension)  (Problem/DX) Acute decompensated heart failure  (Secondary DC/DX) PIO (acute kidney injury)

## 2025-01-16 NOTE — DISCHARGE NOTE PROVIDER - NSDCFUADDAPPT_GEN_ALL_CORE_FT
APPTS ARE READY TO BE MADE: [ ] YES    Best Family or Patient Contact (if needed):    Additional Information about above appointments (if needed):    1: PCP   2:   3:     Other comments or requests:    APPTS ARE READY TO BE MADE: [x] YES    Best Family or Patient Contact (if needed):    Additional Information about above appointments (if needed):    1: PCP   2: Card  3:     Other comments or requests:

## 2025-01-16 NOTE — PROGRESS NOTE ADULT - SUBJECTIVE AND OBJECTIVE BOX
[facility] Division of Hospital Medicine  Mireya Green MD  Pager (M-F, 1H-5P): [phone]  Other Times:  [phone]    Patient is a 64y old  Male who presents with a chief complaint of sob, orthopnea (16 Jan 2025 08:18)      SUBJECTIVE / OVERNIGHT EVENTS:  feeling well  denies any complaints. afebrile.     ADDITIONAL REVIEW OF SYSTEMS: otherwise neg    MEDICATIONS  (STANDING):  aspirin enteric coated 81 milliGRAM(s) Oral daily  atorvastatin 20 milliGRAM(s) Oral at bedtime  carvedilol 12.5 milliGRAM(s) Oral every 12 hours  fluticasone propionate/ salmeterol 250-50 MICROgram(s) Diskus 1 Dose(s) Inhalation two times a day  losartan 50 milliGRAM(s) Oral daily  nicotine -  14 mG/24Hr(s) Patch 1 Patch Transdermal daily  NIFEdipine XL 60 milliGRAM(s) Oral daily  sodium chloride 0.9%. 1000 milliLiter(s) (75 mL/Hr) IV Continuous <Continuous>    MEDICATIONS  (PRN):      CAPILLARY BLOOD GLUCOSE        I&O's Summary    15 Robbin 2025 07:01  -  16 Jan 2025 07:00  --------------------------------------------------------  IN: 900 mL / OUT: 0 mL / NET: 900 mL    16 Jan 2025 07:01  -  16 Jan 2025 14:57  --------------------------------------------------------  IN: 480 mL / OUT: 0 mL / NET: 480 mL        PHYSICAL EXAM:  Vital Signs Last 24 Hrs  T(C): 36.5 (16 Jan 2025 10:53), Max: 36.8 (16 Jan 2025 04:30)  T(F): 97.7 (16 Jan 2025 10:53), Max: 98.2 (16 Jan 2025 04:30)  HR: 61 (16 Jan 2025 10:53) (61 - 76)  BP: 138/84 (16 Jan 2025 10:53) (130/75 - 156/83)  BP(mean): --  RR: 18 (16 Jan 2025 10:53) (18 - 18)  SpO2: 97% (16 Jan 2025 10:53) (95% - 97%)    Parameters below as of 16 Jan 2025 10:53  Patient On (Oxygen Delivery Method): room air        CONSTITUTIONAL: NAD, well-groomed  EYES:  conjunctiva and sclera clear  ENMT: Moist oral mucosa  NECK: Supple, no palpable masses; no JVD  RESPIRATORY: Normal respiratory effort; lungs are clear to auscultation bilaterally  CARDIOVASCULAR: Regular rate and rhythm, normal S1 and S2, no murmur/rub/gallop; No lower extremity edema  ABDOMEN: Nontender to palpation, normoactive bowel sounds, no rebound/guarding  MUSCULOSKELETAL:  no clubbing or cyanosis of digits; no joint swelling or tenderness to palpation  PSYCH: A+O to person, place, and time; affect appropriate  SKIN: No rashes; no palpable lesions    LABS:    01-16    138  |  104  |  54[H]  ----------------------------<  98  3.8   |  20[L]  |  2.92[H]    Ca    8.6      16 Jan 2025 07:00  Mg     2.1     01-16            Urinalysis Basic - ( 16 Jan 2025 07:00 )    Color: x / Appearance: x / SG: x / pH: x  Gluc: 98 mg/dL / Ketone: x  / Bili: x / Urobili: x   Blood: x / Protein: x / Nitrite: x   Leuk Esterase: x / RBC: x / WBC x   Sq Epi: x / Non Sq Epi: x / Bacteria: x          RADIOLOGY & ADDITIONAL TESTS:  Results Reviewed:   Imaging Personally Reviewed:  Electrocardiogram Personally Reviewed:    COORDINATION OF CARE:  Care Discussed with Consultants/Other Providers [Y/N]:  Prior or Outpatient Records Reviewed [Y/N]:

## 2025-01-16 NOTE — DISCHARGE NOTE PROVIDER - NSDCCPCAREPLAN_GEN_ALL_CORE_FT
PRINCIPAL DISCHARGE DIAGNOSIS  Diagnosis: SOB (shortness of breath)  Assessment and Plan of Treatment: You were given IV lasix in the hospital for acute decompensated heart failure.  Recommend continuing with oral lasix on discharge and follow up with cardiology.      SECONDARY DISCHARGE DIAGNOSES  Diagnosis: PIO (acute kidney injury)  Assessment and Plan of Treatment: Follow up with nephrology.     PRINCIPAL DISCHARGE DIAGNOSIS  Diagnosis: SOB (shortness of breath)  Assessment and Plan of Treatment: you were treated with diuretics and inhalers for possible heart failure vs COPD exacerbation.   please follow up with your PMD in 1-2 weeks  please follow up with card and pulm      SECONDARY DISCHARGE DIAGNOSES  Diagnosis: PIO (acute kidney injury)  Assessment and Plan of Treatment: you were found to have elevated kidney function   please hold losartan until outpt follow up with PCP for bloodwork and BP check  Follow up with nephrology.    Diagnosis: COPD exacerbation  Assessment and Plan of Treatment: you were started on inhaler for possible COPD exacerbation  please stop smoking and follow up with pulm for pulmonary function test    Diagnosis: HTN (hypertension)  Assessment and Plan of Treatment: please hold taking losartan until outpt follow up of kidney function and BP  cont with rest of meds

## 2025-01-16 NOTE — DISCHARGE NOTE PROVIDER - NSDCMRMEDTOKEN_GEN_ALL_CORE_FT
aspirin 81 mg oral delayed release tablet: 1 tab(s) orally once a day  carvedilol 12.5 mg oral tablet: 1 tab(s) orally 2 times a day  losartan 50 mg oral tablet: 1 tab(s) orally once a day  NIFEdipine 30 mg oral tablet, extended release: 1 tab(s) orally once a day  rosuvastatin 5 mg oral capsule: 1 cap(s) orally once a day (at bedtime)   aspirin 81 mg oral delayed release tablet: 1 tab(s) orally once a day  carvedilol 12.5 mg oral tablet: 1 tab(s) orally 2 times a day  rosuvastatin 5 mg oral capsule: 1 cap(s) orally once a day (at bedtime)   aspirin 81 mg oral delayed release tablet: 1 tab(s) orally once a day  carvedilol 12.5 mg oral tablet: 1 tab(s) orally 2 times a day  fluticasone-salmeterol: 1 puff(s) inhaled 2 times a day  NIFEdipine 60 mg oral tablet, extended release: 1 tab(s) orally once a day  rosuvastatin 5 mg oral capsule: 1 cap(s) orally once a day (at bedtime)

## 2025-01-16 NOTE — PROGRESS NOTE ADULT - NS ATTEND AMEND GEN_ALL_CORE FT
Patient care and plan discussed and reviewed with Advanced Care Provider. Plan as outlined above edited by me to reflect our discussion.   In addition, I participated in    - Ordering, reviewing, and interpreting labs, testing, and imaging.  - Reviewing prior hospitalization and where necessary, outpatient records.  - Counselling and educating patient and/or family regarding interpretation of aforementioned items and plan of care.  I had a prolonged conversation with the patient/family regarding hospital course, differential diagnosis and results of diagnostic tests.  Plan of care discussed with patient/family after the evaluation. Patient/family express clear understanding and satisfaction with the plan of care.  Fifty one minutes spent on encounter, of which more than fifty percent of the encounter was spent on counseling and/or coordinating care by the attending physician.

## 2025-01-16 NOTE — DISCHARGE NOTE PROVIDER - PROVIDER TOKENS
PROVIDER:[TOKEN:[[id number]],FOLLOWUP:[1 week]] PROVIDER:[TOKEN:[[id number]],FOLLOWUP:[1 week]],PROVIDER:[TOKEN:[[id number]],FOLLOWUP:[Routine]]

## 2025-01-16 NOTE — DISCHARGE NOTE PROVIDER - CARE PROVIDERS DIRECT ADDRESSES
[email or web address]

## 2025-01-16 NOTE — DISCHARGE NOTE PROVIDER - CARE PROVIDER_API CALL
Inez Hobson  Internal Medicine  [address] [place] [address]  Phone: [phone]  Fax: [phone]  Follow Up Time: 1 week   Inez Hobson  Internal Medicine  [address] [place] [address]  Phone: [phone]  Fax: [phone]  Follow Up Time: 1 week    Jin Alva  Cardiovascular Disease  [address] [place] [address], [address]  [place] [address]  Phone: [phone]  Fax: [phone]  Follow Up Time: Routine

## 2025-01-16 NOTE — PROGRESS NOTE ADULT - SUBJECTIVE AND OBJECTIVE BOX
DATE OF SERVICE: 01-16-25 @ 14:54    Patient is a 64y old  Male who presents with a chief complaint of sob, orthopnea (16 Jan 2025 08:18)      INTERVAL HISTORY: feels okay    REVIEW OF SYSTEMS:  CONSTITUTIONAL: No weakness  EYES/ENT: No visual changes;  No throat pain   NECK: No pain or stiffness  RESPIRATORY: No cough, wheezing; No shortness of breath  CARDIOVASCULAR: No chest pain or palpitations  GASTROINTESTINAL: No abdominal  pain. No nausea, vomiting, or hematemesis  GENITOURINARY: No dysuria, frequency or hematuria  NEUROLOGICAL: No stroke like symptoms  SKIN: No rashes      	  MEDICATIONS:  carvedilol 12.5 milliGRAM(s) Oral every 12 hours  losartan 50 milliGRAM(s) Oral daily  NIFEdipine XL 60 milliGRAM(s) Oral daily        PHYSICAL EXAM:  T(C): 36.5 (01-16-25 @ 10:53), Max: 36.8 (01-16-25 @ 04:30)  HR: 61 (01-16-25 @ 10:53) (61 - 76)  BP: 138/84 (01-16-25 @ 10:53) (130/75 - 156/83)  RR: 18 (01-16-25 @ 10:53) (18 - 18)  SpO2: 97% (01-16-25 @ 10:53) (95% - 97%)  Wt(kg): --  I&O's Summary    15 Robbin 2025 07:01  -  16 Jan 2025 07:00  --------------------------------------------------------  IN: 900 mL / OUT: 0 mL / NET: 900 mL    16 Jan 2025 07:01  -  16 Jan 2025 14:54  --------------------------------------------------------  IN: 480 mL / OUT: 0 mL / NET: 480 mL          Appearance: In no distress	  HEENT:    PERRL, EOMI	  Cardiovascular:  S1 S2, No JVD  Respiratory: Lungs clear to auscultation	  Gastrointestinal:  Soft, Non-tender, + BS	  Vascularature:  No edema of LE  Psychiatric: Appropriate affect   Neuro: no acute focal deficits           01-16    138  |  104  |  54[H]  ----------------------------<  98  3.8   |  20[L]  |  2.92[H]    Ca    8.6      16 Jan 2025 07:00  Mg     2.1     01-16          Labs personally reviewed      ASSESSMENT/PLAN: 	    63 y/o M w/ PMH of CKD, HTN, HLD, ?CHF, current smoker, presenting w/ abnormal EKG from PCP office. States he has had heart failure in the past, was admitted to the hospital in 2020 for it and since has been stable. Also had angiogram at that time, reportedly normal. Went to PCP yesterday because he'd been experiencing slowly progressive MORALES and orthopnea over the past few weeks to months. PCP did an EKG and urged patient to come to ED due to abnormalities. Pt is unsure what they are. States he provided the ED with a copy of the abnormal EKG. ED documentation notes pt was sent in for abnormal EKG and proceeds to not include anything about the OP EKG interpretation. Just notes patient came in with HTN crisis and PIO for which they gave him BP meds and lasix. At present, patient resting comfortably in stretcher on his side, feels comfortable, no cp/palpitations, no sob/cough, no HA/vision problems.    Problem/Plan #1: Shortness of Breath  - CXR with clear lungs, cardiomegaly  - BNP 5821  - TTE shows EF 65%, no RWMA, normal LV fillings pressures, severe LVH, moderate AS  - Cath from 2019 with mild, non-obstructive CAD of RCA  - Trop neg  - Symptoms likely 2/2 ADHF i/s/o moderate AS  - Switch to Lasix 40mg PO qd, s/p Lasix 40mg IV qD. Patient feeling significant improvement in symptoms thus far.   - Monitor strict I&Os, daily weights  - CXR with clear lungs, normal filling pressures on TTE and mildly elevated BNP.     Problem/Plan #2: Aortic Stenosis  - Noted as moderate on TTE  - c/w OP surveillance    Problem/Plan #3: Hypertension  - c/w carvedilol 12.5mg PO BIDh  - Nifedipine increased to 60mg PO daily    Problem/Plan #4: HLD  - c/w atorvastatin 20mg PO daily            Iolani Behrbom, AG-KINJAL [place],  [facility]  Cardiovascular Medicine  [address] [place] [address], [address]  Available through call or text on [employer]  Office: [phone]   DATE OF SERVICE: 01-16-25 @ 14:54    Patient is a 64y old  Male who presents with a chief complaint of sob, orthopnea (16 Jan 2025 08:18)      INTERVAL HISTORY: feels okay    REVIEW OF SYSTEMS:  CONSTITUTIONAL: No weakness  EYES/ENT: No visual changes;  No throat pain   NECK: No pain or stiffness  RESPIRATORY: No cough, wheezing; No shortness of breath  CARDIOVASCULAR: No chest pain or palpitations  GASTROINTESTINAL: No abdominal  pain. No nausea, vomiting, or hematemesis  GENITOURINARY: No dysuria, frequency or hematuria  NEUROLOGICAL: No stroke like symptoms  SKIN: No rashes      	  MEDICATIONS:  carvedilol 12.5 milliGRAM(s) Oral every 12 hours  losartan 50 milliGRAM(s) Oral daily  NIFEdipine XL 60 milliGRAM(s) Oral daily        PHYSICAL EXAM:  T(C): 36.5 (01-16-25 @ 10:53), Max: 36.8 (01-16-25 @ 04:30)  HR: 61 (01-16-25 @ 10:53) (61 - 76)  BP: 138/84 (01-16-25 @ 10:53) (130/75 - 156/83)  RR: 18 (01-16-25 @ 10:53) (18 - 18)  SpO2: 97% (01-16-25 @ 10:53) (95% - 97%)  Wt(kg): --  I&O's Summary    15 Robbin 2025 07:01  -  16 Jan 2025 07:00  --------------------------------------------------------  IN: 900 mL / OUT: 0 mL / NET: 900 mL    16 Jan 2025 07:01  -  16 Jan 2025 14:54  --------------------------------------------------------  IN: 480 mL / OUT: 0 mL / NET: 480 mL          Appearance: In no distress	  HEENT:    PERRL, EOMI	  Cardiovascular:  S1 S2, No JVD  Respiratory: Lungs clear to auscultation	  Gastrointestinal:  Soft, Non-tender, + BS	  Vascularature:  No edema of LE  Psychiatric: Appropriate affect   Neuro: no acute focal deficits           01-16    138  |  104  |  54[H]  ----------------------------<  98  3.8   |  20[L]  |  2.92[H]    Ca    8.6      16 Jan 2025 07:00  Mg     2.1     01-16          Labs personally reviewed      ASSESSMENT/PLAN: 	    65 y/o M w/ PMH of CKD, HTN, HLD, ?CHF, current smoker, presenting w/ abnormal EKG from PCP office. States he has had heart failure in the past, was admitted to the hospital in 2020 for it and since has been stable. Also had angiogram at that time, reportedly normal. Went to PCP yesterday because he'd been experiencing slowly progressive MORALES and orthopnea over the past few weeks to months. PCP did an EKG and urged patient to come to ED due to abnormalities. Pt is unsure what they are. States he provided the ED with a copy of the abnormal EKG. ED documentation notes pt was sent in for abnormal EKG and proceeds to not include anything about the OP EKG interpretation. Just notes patient came in with HTN crisis and LYNDA for which they gave him BP meds and lasix. At present, patient resting comfortably in stretcher on his side, feels comfortable, no cp/palpitations, no sob/cough, no HA/vision problems.    Problem/Plan #1: Shortness of Breath  - CXR with clear lungs, cardiomegaly  - BNP 5821  - TTE shows EF 65%, no RWMA, normal LV fillings pressures, severe LVH, moderate AS  - Cath from 2019 with mild, non-obstructive CAD of RCA  - Symptoms likely 2/2 ADHF i/s/o moderate AS. Patient reports resolution of of symptoms with diuresis  - CXR with clear lungs, normal filling pressures on TTE   - Switch to Lasix 40mg PO qd when Cr stablized and Lynda resolved  - Agree with gentle IVF hydration   - s/p Lasix 40mg IV qD    Problem/Plan #2: Aortic Stenosis  - Noted as moderate on TTE  - c/w OP surveillance    Problem/Plan #3: Hypertension  - c/w carvedilol 12.5mg PO BIDh  - Nifedipine increased to 60mg PO daily    Problem/Plan #4: HLD  - c/w atorvastatin 20mg PO daily      Follow up with us next week as OP for repeat labs and volume status reassessment       Iolani Behrbom, AG-NP Omid Javdan, DO [facility]  Cardiovascular Medicine  [address] [place] [address], [address]  Available through call or text on [employer] TE[employer]  Office: [phone]

## 2025-01-17 ENCOUNTER — TRANSCRIPTION ENCOUNTER (OUTPATIENT)
Age: 65
End: 2025-01-17

## 2025-01-17 VITALS
OXYGEN SATURATION: 100 % | TEMPERATURE: 98 F | DIASTOLIC BLOOD PRESSURE: 88 MMHG | SYSTOLIC BLOOD PRESSURE: 168 MMHG | RESPIRATION RATE: 18 BRPM | HEART RATE: 66 BPM

## 2025-01-17 LAB
ANION GAP SERPL CALC-SCNC: 17 MMOL/L — SIGNIFICANT CHANGE UP (ref 5–17)
BUN SERPL-MCNC: 51 MG/DL — HIGH (ref 7–23)
CALCIUM SERPL-MCNC: 8.8 MG/DL — SIGNIFICANT CHANGE UP (ref 8.4–10.5)
CHLORIDE SERPL-SCNC: 104 MMOL/L — SIGNIFICANT CHANGE UP (ref 96–108)
CO2 SERPL-SCNC: 20 MMOL/L — LOW (ref 22–31)
CREAT SERPL-MCNC: 3.03 MG/DL — HIGH (ref 0.5–1.3)
EGFR: 22 ML/MIN/1.73M2 — LOW
GLUCOSE SERPL-MCNC: 85 MG/DL — SIGNIFICANT CHANGE UP (ref 70–99)
NT-PROBNP SERPL-SCNC: 945 PG/ML — HIGH (ref 0–300)
POTASSIUM SERPL-MCNC: 3.9 MMOL/L — SIGNIFICANT CHANGE UP (ref 3.5–5.3)
POTASSIUM SERPL-SCNC: 3.9 MMOL/L — SIGNIFICANT CHANGE UP (ref 3.5–5.3)
SODIUM SERPL-SCNC: 141 MMOL/L — SIGNIFICANT CHANGE UP (ref 135–145)

## 2025-01-17 PROCEDURE — 99285 EMERGENCY DEPT VISIT HI MDM: CPT

## 2025-01-17 PROCEDURE — 96376 TX/PRO/DX INJ SAME DRUG ADON: CPT

## 2025-01-17 PROCEDURE — 83735 ASSAY OF MAGNESIUM: CPT

## 2025-01-17 PROCEDURE — 96375 TX/PRO/DX INJ NEW DRUG ADDON: CPT

## 2025-01-17 PROCEDURE — 84540 ASSAY OF URINE/UREA-N: CPT

## 2025-01-17 PROCEDURE — 87637 SARSCOV2&INF A&B&RSV AMP PRB: CPT

## 2025-01-17 PROCEDURE — 83880 ASSAY OF NATRIURETIC PEPTIDE: CPT

## 2025-01-17 PROCEDURE — 71046 X-RAY EXAM CHEST 2 VIEWS: CPT

## 2025-01-17 PROCEDURE — 84145 PROCALCITONIN (PCT): CPT

## 2025-01-17 PROCEDURE — 83935 ASSAY OF URINE OSMOLALITY: CPT

## 2025-01-17 PROCEDURE — 94640 AIRWAY INHALATION TREATMENT: CPT

## 2025-01-17 PROCEDURE — 82570 ASSAY OF URINE CREATININE: CPT

## 2025-01-17 PROCEDURE — 99239 HOSP IP/OBS DSCHRG MGMT >30: CPT

## 2025-01-17 PROCEDURE — 93005 ELECTROCARDIOGRAM TRACING: CPT

## 2025-01-17 PROCEDURE — 76770 US EXAM ABDO BACK WALL COMP: CPT

## 2025-01-17 PROCEDURE — 84156 ASSAY OF PROTEIN URINE: CPT

## 2025-01-17 PROCEDURE — 84300 ASSAY OF URINE SODIUM: CPT

## 2025-01-17 PROCEDURE — 85025 COMPLETE CBC W/AUTO DIFF WBC: CPT

## 2025-01-17 PROCEDURE — 84133 ASSAY OF URINE POTASSIUM: CPT

## 2025-01-17 PROCEDURE — 36415 COLL VENOUS BLD VENIPUNCTURE: CPT

## 2025-01-17 PROCEDURE — 85027 COMPLETE CBC AUTOMATED: CPT

## 2025-01-17 PROCEDURE — 84484 ASSAY OF TROPONIN QUANT: CPT

## 2025-01-17 PROCEDURE — 81001 URINALYSIS AUTO W/SCOPE: CPT

## 2025-01-17 PROCEDURE — 96374 THER/PROPH/DIAG INJ IV PUSH: CPT

## 2025-01-17 PROCEDURE — 85610 PROTHROMBIN TIME: CPT

## 2025-01-17 PROCEDURE — 80053 COMPREHEN METABOLIC PANEL: CPT

## 2025-01-17 PROCEDURE — 80048 BASIC METABOLIC PNL TOTAL CA: CPT

## 2025-01-17 PROCEDURE — 85730 THROMBOPLASTIN TIME PARTIAL: CPT

## 2025-01-17 PROCEDURE — 93356 MYOCRD STRAIN IMG SPCKL TRCK: CPT

## 2025-01-17 PROCEDURE — 93306 TTE W/DOPPLER COMPLETE: CPT

## 2025-01-17 RX ORDER — NIFEDIPINE 60 MG/1
1 TABLET, EXTENDED RELEASE ORAL
Qty: 30 | Refills: 0
Start: 2025-01-17 | End: 2025-02-15

## 2025-01-17 RX ORDER — LOSARTAN POTASSIUM 100 MG/1
1 TABLET, FILM COATED ORAL
Refills: 0 | DISCHARGE

## 2025-01-17 RX ORDER — FLUTICASONE PROPIONATE AND SALMETEROL 50; 500 UG/1; UG/1
1 POWDER ORAL; RESPIRATORY (INHALATION)
Qty: 0 | Refills: 0 | DISCHARGE
Start: 2025-01-17

## 2025-01-17 RX ORDER — NIFEDIPINE 60 MG/1
1 TABLET, EXTENDED RELEASE ORAL
Refills: 0 | DISCHARGE

## 2025-01-17 RX ADMIN — FLUTICASONE PROPIONATE AND SALMETEROL 1 DOSE(S): 50; 500 POWDER ORAL; RESPIRATORY (INHALATION) at 09:04

## 2025-01-17 RX ADMIN — NIFEDIPINE 60 MILLIGRAM(S): 60 TABLET, EXTENDED RELEASE ORAL at 05:04

## 2025-01-17 RX ADMIN — Medication 81 MILLIGRAM(S): at 11:21

## 2025-01-17 RX ADMIN — NICOTINE POLACRILEX 1 PATCH: 4 LOZENGE ORAL at 11:22

## 2025-01-17 RX ADMIN — NICOTINE POLACRILEX 1 PATCH: 4 LOZENGE ORAL at 08:21

## 2025-01-17 RX ADMIN — NICOTINE POLACRILEX 1 PATCH: 4 LOZENGE ORAL at 11:21

## 2025-01-17 RX ADMIN — SODIUM CHLORIDE 75 MILLILITER(S): 9 INJECTION, SOLUTION INTRAMUSCULAR; INTRAVENOUS; SUBCUTANEOUS at 09:03

## 2025-01-17 RX ADMIN — CARVEDILOL 12.5 MILLIGRAM(S): 25 TABLET, FILM COATED ORAL at 05:03

## 2025-01-17 NOTE — PHARMACOTHERAPY INTERVENTION NOTE - INTERVENTION CATEGORIES
Subjective   Patient ID: Brent is a 43 year old male presents to establish care and to discuss anxiety.    Chief Complaint   Patient presents with   • Office Visit   • Establish Care     New Patient-Anxiety( going thru a possible divorce)      42 yo male with hx of presents to establish care and to discuss anxiety    Anxiety/depression- will possibly be getting a divorce and having more anxiety  Looks like in the past he was on buspirone and clonazepam 05/2021  Also had gabapentin from psychiatry  Also has tried zoloft in the past  Short fuse and lack of patience  Good days and bad days  No impulsivity   Last two weeks averaged about 3 hours of sleep a day, now is better  Started exercising and eating more  Has been meditating and praying more and has been sleeping a little better but still waking up a few times a night    In 2000 had SI- had inpatient treatment and then continued outpatient treatment was on zoloft at that time and saw therapist    Eventually he stopped taking his medications-states he felt like he was a little out of control was changing a lot of medications and then just stopped everything.    Seeing therapist currently. Tabatha Becerra    GERD- was on pantoprazole, has gotten better and no longer needs medication    Generalized Anxiety Disorder (GAD7)    Over the last 2 weeks, how often have you been bothered by the following problems?   Score: 19    Score:  0-4 minimal symptoms 10-14 moderate symptoms   5-9 mild symptoms 15-21 severe symptoms      1.  Feeling nervous, anxious or on edge Nearly every day   2.  Not being able to stop or control worrying Nearly every day   3.  Worrying too much about different things Nearly every day   4.  Trouble relaxing Nearly every day   5.  Being so restless that it's hard to sit still Nearly every day   6.  Becoming easily annoyed or irritable Several days   7.  Feeling afraid as if something awful might happen Nearly every day            If you checked off any  problems, how difficult have these made it for you to do your work, take care of things at home, or get along with other people?         Recent PHQ 2/9 Score    PHQ 2:  Date Adult PHQ 2 Score Adult PHQ 2 Interpretation   10/7/2022 5 Further screening needed       PHQ 9:  Date Adult PHQ 9 Score Adult PHQ 9 Interpretation   10/7/2022 13 Moderate Depression     PHQ-2/9 Depression Screening  Little interest or pleasure in activity?: Nearly every day  Feeling down, depressed or hopeless?: More than half the days  Initial depression screening score:: 5  PHQ2 Interpretation: Further screening needed  Trouble falling or staying asleep or sleeping all the time?: Several days  Feeling tired or having little energy?: Not at all  Poor appetite or overeating?: Nearly every day  Feeling bad about yourself or that you are a failure or have let yourself or family down?: Nearly every day  Trouble concentrating on things such as reading the newspaper or watching TV?: Several days  Moving or speaking slowly that other people have noticed or the opposite - being so fidgety or restless that you have been moving around a lot more than usual?: Not at all  Thoughts that you would be better off dead or of hurting yourself in some way?: Not at all  Total depressive symptoms score (PHQ9): : 13  PHQ9 Interpretation: Moderate Depression     surg hx  none    fam hx   Father no relationshiop-drug addict and passed away  Mother     Social  No alcohol- 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    Review of Systems   Constitutional: Negative for activity change, appetite change, chills, fatigue, fever and unexpected weight change.   HENT: Negative for congestion, dental problem, ear discharge, ear pain, rhinorrhea, sinus  Patient Education pressure and sore throat.    Eyes: Negative for pain and visual disturbance.   Respiratory: Negative for cough, shortness of breath and wheezing.    Cardiovascular: Negative for chest pain and palpitations.   Gastrointestinal: Negative for abdominal pain, constipation, diarrhea, nausea and vomiting.   Endocrine: Negative for polydipsia and polyuria.   Genitourinary: Negative for dysuria, flank pain and frequency.   Musculoskeletal: Negative for arthralgias, back pain and neck pain.   Skin: Negative for pallor and rash.   Allergic/Immunologic: Negative for environmental allergies and food allergies.   Neurological: Negative for dizziness, weakness, numbness and headaches.   Hematological: Does not bruise/bleed easily.   Psychiatric/Behavioral: Negative for confusion, decreased concentration and hallucinations.       Visit Vitals  /78   Pulse 72   Resp 19   Ht 6' 1\" (1.854 m)   Wt 85.7 kg (189 lb 0.7 oz)   SpO2 99%   BMI 24.94 kg/m²       Objective   Physical Exam  Constitutional:       General: He is not in acute distress.     Appearance: Normal appearance. He is well-developed and normal weight. He is not diaphoretic.   HENT:      Head: Normocephalic and atraumatic.      Right Ear: Tympanic membrane, ear canal and external ear normal.      Left Ear: Tympanic membrane, ear canal and external ear normal.      Nose: Nose normal.      Mouth/Throat:      Mouth: Mucous membranes are moist.      Pharynx: Oropharynx is clear. No oropharyngeal exudate or posterior oropharyngeal erythema.      Neck: Normal range of motion and neck supple.   Eyes:      General:         Right eye: No discharge.         Left eye: No discharge.      Extraocular Movements: Extraocular movements intact.      Conjunctiva/sclera: Conjunctivae normal.      Pupils: Pupils are equal, round, and reactive to light.   Cardiovascular:      Rate and Rhythm: Normal rate and regular rhythm.      Heart sounds: Normal heart sounds. No murmur  heard.  Pulmonary:      Effort: Pulmonary effort is normal. No respiratory distress.      Breath sounds: Normal breath sounds. No wheezing.   Abdominal:      General: Bowel sounds are normal. There is no distension.      Palpations: Abdomen is soft. There is no mass.      Tenderness: There is no abdominal tenderness. There is no guarding or rebound.   Musculoskeletal:         General: No deformity.      Right lower leg: No edema.      Left lower leg: No edema.   Lymphadenopathy:      Cervical: No cervical adenopathy.   Skin:     General: Skin is warm and dry.   Neurological:      General: No focal deficit present.      Mental Status: He is alert and oriented to person, place, and time.      Cranial Nerves: No cranial nerve deficit.      Sensory: No sensory deficit.      Motor: No weakness.      Coordination: Coordination normal.      Gait: Gait normal.      Deep Tendon Reflexes: Reflexes normal.   Psychiatric:         Behavior: Behavior normal.         Thought Content: Thought content normal.         Judgment: Judgment normal.         Assessment   Problem List Items Addressed This Visit        Health Encounters    Encounter to establish care - Primary     tdap UTD per patient  Flu shot declined today    RTC in 2 weeks for follow up anxiety/depression            Mental Health    Anxiety     To start zoloft  To start buspar- has at his house         Major depressive disorder, recurrent episode, moderate (CMS/HCC)     Monitor: The problem is worsening.  Evaluation: No labs/tests required today.  Assessment/Treatment:  Update treatment regimen per encounter summary   New rx sent for zoloft to pharmacy     RTC in 2 weeks for follow up           started buspar which he had at home 15mg daily

## 2025-01-17 NOTE — PROGRESS NOTE ADULT - PROBLEM SELECTOR PLAN 4
suspect not PIO more CKD- per pt has had increased renal function Cr ~ 2.5 as he recalls. told he need to see renal.   would hold of further lasix given lack of significant overload on exam and imaging . Cr also increased  can resume losartan given uncontrolled BP
PIO on CKD- per pt has had increased renal function Cr ~ 2.5 as he recalls. told he need to see renal.   would hold of further lasix given lack of significant overload on exam and imaging . Cr also increased  hold losartan
suspect not PIO more CKD- per pt has had increased renal function Cr ~ 2.5 as he recalls. told he need to see renal.   would hold of further lasix given lack of significant overload on exam and imaging . Cr also increased  can resume losartan given uncontrolled BP

## 2025-01-17 NOTE — PROGRESS NOTE ADULT - PROBLEM SELECTOR PROBLEM 2
----- Message from Shawna Simon Shawna Simon MD   
COPD exacerbation

## 2025-01-17 NOTE — DISCHARGE NOTE NURSING/CASE MANAGEMENT/SOCIAL WORK - NSTRANSFEREYEGLASSESPAIRS_GEN_A_NUR
"Clinic Note    Patient Name: Tiffani Couch  : 1967  MRN: [id number]    Chief Complaint   Patient presents with    Follow-up     1 month     Recurrent Skin Infections     On lower abdomen        Medication Refill       HPI:    Ms. Tiffani Couch is a 55 y.o. female who presents to clinic today with CC of follow up on chronic disease processes including chronic back and joint pain/arthritis.   Patient reports chronic issues are well controlled on current medication regimen.  Denies problems or side effects with medications.  She also reports skin infection to lower abdomen. Reports "boils" are a chronic, intermittent issue for her. Reports she has several locations to lower abdomen she is currently concerned about.  Patient is, otherwise, without complaints.     Medications:  Medication List with Changes/Refills   New Medications    CLINDAMYCIN (CLEOCIN) 300 MG CAPSULE    Take 1 capsule (300 mg total) by mouth every 8 (eight) hours. for 10 days   Current Medications    ALBUTEROL (PROVENTIL/VENTOLIN HFA) 90 MCG/ACTUATION INHALER    Inhale 2 puffs into the lungs every 6 (six) hours as needed for Wheezing. Rescue    AMLODIPINE (NORVASC) 10 MG TABLET    Take 1 tablet (10 mg total) by mouth once daily.    ATORVASTATIN (LIPITOR) 40 MG TABLET    Take 1 tablet by mouth once daily.    BLOOD SUGAR DIAGNOSTIC (BLOOD GLUCOSE TEST) STRP    For twice daily home blood glucose monitoring (Dx: Type II DM E11.9)    BLOOD SUGAR DIAGNOSTIC (TRUETEST TEST STRIPS MISC)    1 strip by Misc.(Non-Drug; Combo Route) route.    BLOOD-GLUCOSE METER (ACCU-CHEK GUIDE GLUCOSE METER) [facility]    For twice daily home blood glucose monitoring (Dx: Type II DM E11.9)    BLOOD-GLUCOSE METER MISC    by Misc.(Non-Drug; Combo Route) route 2 (two) times a day.    CETIRIZINE (ZYRTEC) 10 MG TABLET    Take 10 mg by mouth once daily.    CHLORPHENIRAMINE-PHENYLEPH-DM (ED A-HIST DM) 4-10-10 MG TAB    Take 1 tablet by mouth every 8 (eight) hours as needed (congestion or " cough).    GOLYTELY 236-22.74-6.74 -5.86 GRAM SUSPENSION    SMARTSIG:Milliliter(s) By Mouth    HYDRALAZINE (APRESOLINE) 25 MG TABLET    Take 1 tablet (25 mg total) by mouth every 12 (twelve) hours.    LANCETS (ULTRA THIN LANCETS) 31 GAUGE MISC    For twice daily blood glucose monitoring Dx: Type II DM E11.9)    LOSARTAN (COZAAR) 100 MG TABLET    Take 1 tablet (100 mg total) by mouth once daily.    METFORMIN (GLUCOPHAGE) 500 MG TABLET    Take 1 tablet (500 mg total) by mouth daily with breakfast.    OMEPRAZOLE (PRILOSEC) 20 MG CAPSULE    Take 1 capsule (20 mg total) by mouth 2 (two) times daily as needed (acid reflux/heartburn).    POTASSIUM CHLORIDE (MICRO-K) 10 MEQ CPSR    Take 2 capsules (20 mEq total) by mouth 2 (two) times daily.    TIZANIDINE (ZANAFLEX) 4 MG TABLET    Take 1 tablet (4 mg total) by mouth every 8 (eight) hours as needed (muscle spasm).    TRAZODONE (DESYREL) 50 MG TABLET    Take 2 tablets (100 mg total) by mouth nightly as needed for Insomnia. 2 tablets at night time prn    TRIAMTERENE-HYDROCHLOROTHIAZIDE 37.5-25 MG (DYAZIDE) 37.5-25 MG PER CAPSULE    Take 1 capsule by mouth every morning.   Changed and/or Refilled Medications    Modified Medication Previous Medication    HYDROCODONE-ACETAMINOPHEN (NORCO)  MG PER TABLET HYDROcodone-acetaminophen (NORCO)  mg per tablet       Take 1 tablet by mouth every 8 (eight) hours as needed for Pain.    Take 1 tablet by mouth every 8 (eight) hours as needed for Pain.        Allergies: Bactrim [sulfamethoxazole-trimethoprim] and Penicillins      Past Medical History:    Past Medical History:   Diagnosis Date    Anxiety     Arthritis     Asthma     Depression     Diabetes mellitus, type 2     GERD (gastroesophageal reflux disease)     Hyperlipidemia     Hypertension        Past Surgical History:    Past Surgical History:   Procedure Laterality Date    BRAIN SURGERY      CHOLECYSTECTOMY           Social History:    Social History     Tobacco Use  "  Smoking Status Every Day    Packs/day: 1.00    Years: 37.00    Pack years: 37.00    Types: Cigarettes    Start date: 1985   Smokeless Tobacco Never     Social History     Substance and Sexual Activity   Alcohol Use Never     Social History     Substance and Sexual Activity   Drug Use Not Currently         Family History:    Family History   Problem Relation Age of Onset    Diabetes Mother     Hypertension Mother     Breast cancer Sister        Review of Systems:    Review of Systems   Constitutional:  Negative for appetite change, chills, fatigue, fever and unexpected weight change.   Eyes:  Negative for visual disturbance.   Respiratory:  Negative for cough and shortness of breath.    Cardiovascular:  Negative for chest pain and leg swelling.   Gastrointestinal:  Negative for abdominal pain, change in bowel habit, constipation, diarrhea, nausea, vomiting and change in bowel habit.   Musculoskeletal:  Positive for arthralgias and back pain.   Integumentary:  Negative for rash.        +"Boils"   Neurological:  Negative for dizziness and headaches.   Psychiatric/Behavioral:  The patient is not nervous/anxious.       Vitals:    Vitals:    04/24/23 1314   BP: 113/76   BP Location: Left arm   Patient Position: Sitting   BP Method: Large (Automatic)   Pulse: 88   Resp: 18   Temp: 98.7 °F (37.1 °C)   TempSrc: Oral   SpO2: 96%   Weight: 110 kg (242 lb 9.6 oz)   Height: 5' 5" (1.651 m)       Body mass index is 40.37 kg/m².    Wt Readings from Last 3 Encounters:   04/24/23 1314 110 kg (242 lb 9.6 oz)   03/21/23 1319 110.4 kg (243 lb 6.4 oz)   02/16/23 1315 106.6 kg (235 lb)        Physical Exam:    Physical Exam  Constitutional:       General: She is not in acute distress.     Appearance: Normal appearance. She is obese.   HENT:      Nose: Nose normal.      Mouth/Throat:      Mouth: Mucous membranes are moist.      Pharynx: Oropharynx is clear.   Eyes:      Conjunctiva/sclera: Conjunctivae normal.   Cardiovascular:      " Rate and Rhythm: Normal rate and regular rhythm.      Heart sounds: Normal heart sounds. No murmur heard.  Pulmonary:      Effort: Pulmonary effort is normal. No respiratory distress.      Breath sounds: Normal breath sounds. No wheezing, rhonchi or rales.   Abdominal:      General: Bowel sounds are normal.      Palpations: Abdomen is soft.      Tenderness: There is no abdominal tenderness.   Musculoskeletal:         General: Normal range of motion.      Cervical back: Neck supple.      Right lower leg: No edema.      Left lower leg: No edema.   Skin:     Findings: No rash.      Comments: + scarring to lower abdomen from prior abscess/skin infection  + open but non-draining appears to be healing area to R lower abdomen  + dry well healing area to R lower abdomen  + erythematous region to R lower abdomen without drainage or induration.   Neurological:      General: No focal deficit present.      Mental Status: She is alert. Mental status is at baseline.   Psychiatric:         Mood and Affect: Mood normal.         Assessment/Plan:   1. Other chronic pain  -     HYDROcodone-acetaminophen (NORCO)  mg per tablet; Take 1 tablet by mouth every 8 (eight) hours as needed for Pain.  Dispense: 30 tablet; Refill: 0  -  appropriate. UDS positive for benzos and norco. This is the second time this has happened. The first time she advised she accidentally took some of her husbands xanax. Warning was given. Risks associated with dual use and prescribing laws were discussed.   -  30 Norco tablets prescribed today. She will need to establish care else where for controlled substances. Advised we can continue to treat any other conditions but will no logner write controlled substances in this clinic. Voiced understanding.    2. Other long term (current) drug therapy  -     POCT Urine Drug Screen Presump  -     HYDROcodone-acetaminophen (NORCO)  mg per tablet; Take 1 tablet by mouth every 8 (eight) hours as needed for  Pain.  Dispense: 30 tablet; Refill: 0    3. Local infection of skin and subcutaneous tissue  -     clindamycin (CLEOCIN) 300 MG capsule; Take 1 capsule (300 mg total) by mouth every 8 (eight) hours. for 10 days  Dispense: 30 capsule; Refill: 0         Active Problem List with Overview Notes    Diagnosis Date Noted    Essential hypertension 09/21/2021    Type 2 diabetes mellitus with hyperglycemia, without long-term current use of insulin 09/21/2021    Other hyperlipidemia 06/20/2022    Other chronic pain 08/19/2021    Gastroesophageal reflux disease without esophagitis 01/18/2023    Class 3 severe obesity with serious comorbidity and body mass index (BMI) of 40.0 to 44.9 in adult 11/22/2022    Positive colorectal cancer screening using Cologuard test 11/01/2022    Seasonal allergic rhinitis 03/24/2023        Health Maintenance:  Health Maintenance   Topic Date Due    Eye Exam  Never done    LDCT Lung Screen  Never done    TETANUS VACCINE  03/21/2024 (Originally 7/31/1987)    Hemoglobin A1c  07/18/2023    Lipid Panel  01/18/2024    Mammogram  02/13/2024    Foot Exam  02/16/2024    Low Dose Statin  04/24/2024    Hepatitis C Screening  Completed       RTC in 2 weeks for follow up on skin infection.  RTC sooner if needed.   Patient voiced understanding and is agreeable to plan.      Aruna Mora MD    Family Medicine       1 pair

## 2025-01-17 NOTE — PROGRESS NOTE ADULT - PROBLEM SELECTOR PLAN 1
Pt reports remote hx of hospitalization for HF and normal angiogram however is not currently on GDMT and is otherwise a poor historian. Has been having chronic progressively worsening MORALES and orthopnea as OP which prompted visit to PCP who sent him in for abnormal EKG but EKG no longer found.   - pt notes he lost cards f/u due to insurance change and hasn't seen anyone in years   - Last TTE in system 2019 with moderate AS, normal LV systolic function, grade I diastolic dysfunction  Repeat TTE with slight worse AS but preserve LV EF 65% , LVH.  - EKG here without any acute concerning findings, reached out to Dr. Hobson's office and cell [phone]   for further collateral, OP EKG showed new TWI in lateral leads, now resolved on our EKGs.  - tropes indeterminate, pro BNP is elevated but pt also has CKD  - previous provider heard wheezing but none appreciated today.   - no obvious s+s of decompensated HF ? more COPD exacerbation .  - Cr slight uptrended - will hold off futher lasix now .   - monitor strict i/o  card eval Dr. Alva called
Pt reports remote hx of hospitalization for HF and normal angiogram however is not currently on GDMT and is otherwise a poor historian. Has been having chronic progressively worsening MORALES and orthopnea as OP which prompted visit to PCP who sent him in for abnormal EKG but EKG no longer found.   - pt notes he lost cards f/u due to insurance change and hasn't seen anyone in years   - Last TTE in system 2019 with moderate AS, normal LV systolic function, grade I diastolic dysfunction  Repeat TTE with slight worse AS but preserve LV EF 65% , LVH.  - EKG here without any acute concerning findings, reached out to Dr. Hobson's office and cell [phone]   for further collateral, OP EKG showed new TWI in lateral leads, now resolved on our EKGs.  - tropes indeterminate, pro BNP is elevated but pt also has CKD  - previous provider heard wheezing but none appreciated today.   - no obvious s+s of decompensated HF ? more COPD exacerbation .  - Cr slight uptrended - will hold off futher lasix now . hold losartan - Cr now downtrend  - monitor strict i/o
Pt reports remote hx of hospitalization for HF and normal angiogram however is not currently on GDMT and is otherwise a poor historian. Has been having chronic progressively worsening MORALES and orthopnea as OP which prompted visit to PCP who sent him in for abnormal EKG but EKG no longer found.   - pt notes he lost cards f/u due to insurance change and hasn't seen anyone in years   - Last TTE in system 2019 with moderate AS, normal LV systolic function, grade I diastolic dysfunction  Repeat TTE with slight worse AS but preserve LV EF 65% , LVH.  - EKG here without any acute concerning findings, reached out to Dr. Hobson's office and cell [phone]   for further collateral, OP EKG showed new TWI in lateral leads, now resolved on our EKGs.  - tropes indeterminate, pro BNP is elevated but pt also has CKD  - previous provider heard wheezing but none appreciated today.   - no obvious s+s of decompensated HF ? more COPD exacerbation .  - Cr slight uptrended - will hold off futher lasix now .   - monitor strict i/o  card eval Dr. Alva called

## 2025-01-17 NOTE — DISCHARGE NOTE NURSING/CASE MANAGEMENT/SOCIAL WORK - NSDCPEFALRISK_GEN_ALL_CORE
For information on Fall & Injury Prevention, visit: [email or web address] OR  [email or web address] OR  https://www.cdc.gov/steadi/patient.html

## 2025-01-17 NOTE — PROGRESS NOTE ADULT - ASSESSMENT
64m hx HTN, ?CHF, HLD, active smoker presenting from PCP office with abnormal EKG in the setting of chronic progressively worsening MORALES and orthopnea, concerning for mild ADHF

## 2025-01-17 NOTE — DISCHARGE NOTE NURSING/CASE MANAGEMENT/SOCIAL WORK - NSDCFUADDAPPT_GEN_ALL_CORE_FT
APPTS ARE READY TO BE MADE: [x] YES    Best Family or Patient Contact (if needed):    Additional Information about above appointments (if needed):    1: PCP   2: Card  3:     Other comments or requests:

## 2025-01-17 NOTE — PROGRESS NOTE ADULT - PROBLEM SELECTOR PLAN 3
With htn crisis on presentation, now improved s/p BP meds and lasix  Per pt has not had good BP control as outpt , SBP mostly 170s office and home checks   previously on lisinopril but changed to losartan in Sept 2024   now on increase dose nifedipine here   will start back on losartan (pt with CKD per pt has had inc baseline Cr ~ 2.5 as outpt likely sec to uncontrolled BP)
With htn crisis on presentation, now improved s/p BP meds and lasix  Per pt has not had good BP control as outpt , SBP mostly 170s office and home checks   previously on lisinopril but changed to losartan in Sept 2024   now on increase dose nifedipine here   will start back on losartan (pt with CKD per pt has had inc baseline Cr ~ 2.5 as outpt likely sec to uncontrolled BP)
With htn crisis on presentation, now improved s/p BP meds and lasix  Per pt has not had good BP control as outpt , SBP mostly 170s office and home checks   previously on lisinopril but changed to losartan in Sept 2024   now on increase dose nifedipine here   hold losartan given elevate Cr

## 2025-01-17 NOTE — PROGRESS NOTE ADULT - PROBLEM SELECTOR PLAN 2
overall presentation possibly more concerning for COPD. no formal dx has not seen pulm   chronic smoker for 40 years half pack per day   on exam today no wheezing . per previous provider had exp wheezing.   will start on maintenance inhaler - advair twice a day.   will hold off steroid given current lack of symptoms.    outpt follow up with pulm for likely PFTs

## 2025-01-17 NOTE — PHARMACOTHERAPY INTERVENTION NOTE - COMMENTS
Counseled patient on the following inpatient/discharge medications names (brand/generic), indication, and possible side effects:  Nifedipine Xl 60mg once a day (educated pt dose was increased from 30 to 60mg)    Patient was provided with a medication card for their medication (Nifedipine). Patient questions and concerns were answered and addressed. Patient demonstrated understanding. Patient is aware rx's were sent to Alejandro Paige PharmD  Clinical Pharmacist  Teams (preferred)     Counseled patient on the following inpatient/discharge medications names (brand/generic), indication, and possible side effects:  Nifedipine Xl 60mg once a day (educated pt dose was increased from 30 to 60mg)  Counseled to hold lasix and losartan and follow up with outpatient provider    Patient was provided with a medication card for their medication (Nifedipine). Patient questions and concerns were answered and addressed. Patient demonstrated understanding. Patient is aware rx's were sent to Alejandro Paige PharmD  Clinical Pharmacist  Teams (preferred)    Shey Galvez PharmD, [facility]  Clinical Pharmacy Specialist  Teams (preferred) or [phone]

## 2025-01-17 NOTE — DISCHARGE NOTE NURSING/CASE MANAGEMENT/SOCIAL WORK - PATIENT PORTAL LINK FT
You can access the FollowMyHealth Patient Portal offered by [facility] by registering at the following website: [email or web address]. By joining [facility]’s FollowMyHealth portal, you will also be able to view your health information using other applications (apps) compatible with our system.

## 2025-01-17 NOTE — PROGRESS NOTE ADULT - SUBJECTIVE AND OBJECTIVE BOX
DATE OF SERVICE: 01-17-25 @ 08:17    Patient is a 64y old  Male who presents with a chief complaint of sob, orthopnea (16 Jan 2025 14:56)      INTERVAL HISTORY: in no acute distress    REVIEW OF SYSTEMS:  CONSTITUTIONAL: No weakness  EYES/ENT: No visual changes;  No throat pain   NECK: No pain or stiffness  RESPIRATORY: No cough, wheezing; No shortness of breath  CARDIOVASCULAR: No chest pain or palpitations  GASTROINTESTINAL: No abdominal  pain. No nausea, vomiting, or hematemesis  GENITOURINARY: No dysuria, frequency or hematuria  NEUROLOGICAL: No stroke like symptoms  SKIN: No rashes    TELEMETRY Personally reviewed: SR 70-80 bpm  	  MEDICATIONS:  carvedilol 12.5 milliGRAM(s) Oral every 12 hours  NIFEdipine XL 60 milliGRAM(s) Oral daily        PHYSICAL EXAM:  T(C): 36.7 (01-17-25 @ 04:07), Max: 36.7 (01-17-25 @ 04:07)  HR: 64 (01-17-25 @ 04:07) (61 - 65)  BP: 161/83 (01-17-25 @ 04:07) (138/78 - 161/83)  RR: 18 (01-17-25 @ 04:07) (18 - 18)  SpO2: 94% (01-17-25 @ 04:07) (94% - 97%)  Wt(kg): --  I&O's Summary    16 Jan 2025 07:01  -  17 Jan 2025 07:00  --------------------------------------------------------  IN: 2100 mL / OUT: 0 mL / NET: 2100 mL          Appearance: In no distress	  HEENT:    PERRL, EOMI	  Cardiovascular:  S1 S2, No JVD  Respiratory: Lungs clear to auscultation	  Gastrointestinal:  Soft, Non-tender, + BS	  Vascularature:  No edema of LE  Psychiatric: Appropriate affect   Neuro: no acute focal deficits           01-17    141  |  104  |  51[H]  ----------------------------<  85  3.9   |  20[L]  |  3.03[H]    Ca    8.8      17 Jan 2025 06:19  Mg     2.1     01-16          Labs personally reviewed      ASSESSMENT/PLAN: 	        63 y/o M w/ PMH of CKD, HTN, HLD, ?CHF, current smoker, presenting w/ abnormal EKG from PCP office. States he has had heart failure in the past, was admitted to the hospital in 2020 for it and since has been stable. Also had angiogram at that time, reportedly normal. Went to PCP yesterday because he'd been experiencing slowly progressive MORALES and orthopnea over the past few weeks to months. PCP did an EKG and urged patient to come to ED due to abnormalities. Pt is unsure what they are. States he provided the ED with a copy of the abnormal EKG. ED documentation notes pt was sent in for abnormal EKG and proceeds to not include anything about the OP EKG interpretation. Just notes patient came in with HTN crisis and LYNDA for which they gave him BP meds and lasix. At present, patient resting comfortably in stretcher on his side, feels comfortable, no cp/palpitations, no sob/cough, no HA/vision problems.    Problem/Plan #1: Shortness of Breath  - CXR with clear lungs, cardiomegaly  - BNP 5821  - TTE shows EF 65%, no RWMA, normal LV fillings pressures, severe LVH, moderate AS  - Cath from 2019 with mild, non-obstructive CAD of RCA  - Symptoms likely 2/2 ADHF i/s/o moderate AS. Patient reports resolution of of symptoms with diuresis  - CXR with clear lungs, normal filling pressures on TTE   - Switch to Lasix 40mg PO qd when Cr stablized and Lynda resolved  - Agree with gentle IVF hydration   - s/p Lasix 40mg IV qD  - proBNP 1/13 5k --> 1/17 proBNP 945    Problem/Plan #2: Aortic Stenosis  - Noted as moderate on TTE  - c/w OP surveillance    Problem/Plan #3: Hypertension  - c/w carvedilol 12.5mg PO BIDh  - Nifedipine 60mg PO daily    Problem/Plan #4: HLD  - c/w atorvastatin 20mg PO daily      Follow up with us next week as OP for repeat labs and volume status reassessment         SHAKA Chi,  [facility]  Cardiovascular Medicine  [address], [address]  Available through call or text on [employer]  Office: [phone]

## 2025-01-17 NOTE — DISCHARGE NOTE NURSING/CASE MANAGEMENT/SOCIAL WORK - NSDCPNINST_GEN_ALL_CORE
Call and make follow up appointment with MD after discharged. Return to the nearest ER or call 911 for worsening chest pain, cough, difficulty breathing, leg swelling ,or  palpitations.

## 2025-01-17 NOTE — PROGRESS NOTE ADULT - PROBLEM SELECTOR PLAN 5
nicotine patch    dispo: likely home      plan d/w and agreed on by pt.

## 2025-01-17 NOTE — DISCHARGE NOTE NURSING/CASE MANAGEMENT/SOCIAL WORK - FINANCIAL ASSISTANCE
[facility] provides services at a reduced cost to those who are determined to be eligible through [facility]’s financial assistance program. Information regarding [facility]’s financial assistance program can be found by going to [email or web address] or by calling [phone].

## 2025-01-17 NOTE — PROGRESS NOTE ADULT - SUBJECTIVE AND OBJECTIVE BOX
[facility] Division of Hospital Medicine  Mireya Green MD  Pager (M-F, 2V-5P): [phone]  Other Times:  [phone]    Patient is a 64y old  Male who presents with a chief complaint of sob, orthopnea (17 Jan 2025 08:17)      SUBJECTIVE / OVERNIGHT EVENTS:  feeling well. denies any complaints afebrile.     ADDITIONAL REVIEW OF SYSTEMS: otherwise neg    MEDICATIONS  (STANDING):  aspirin enteric coated 81 milliGRAM(s) Oral daily  atorvastatin 20 milliGRAM(s) Oral at bedtime  carvedilol 12.5 milliGRAM(s) Oral every 12 hours  fluticasone propionate/ salmeterol 250-50 MICROgram(s) Diskus 1 Dose(s) Inhalation two times a day  nicotine -  14 mG/24Hr(s) Patch 1 Patch Transdermal daily  NIFEdipine XL 60 milliGRAM(s) Oral daily  sodium chloride 0.9%. 1000 milliLiter(s) (75 mL/Hr) IV Continuous <Continuous>  sodium chloride 0.9%. 1000 milliLiter(s) (75 mL/Hr) IV Continuous <Continuous>    MEDICATIONS  (PRN):      CAPILLARY BLOOD GLUCOSE        I&O's Summary    16 Jan 2025 07:01  -  17 Jan 2025 07:00  --------------------------------------------------------  IN: 2100 mL / OUT: 0 mL / NET: 2100 mL        PHYSICAL EXAM:  Vital Signs Last 24 Hrs  T(C): 36.6 (17 Jan 2025 11:12), Max: 36.7 (17 Jan 2025 04:07)  T(F): 97.9 (17 Jan 2025 11:12), Max: 98.1 (17 Jan 2025 04:07)  HR: 66 (17 Jan 2025 11:12) (64 - 66)  BP: 168/88 (17 Jan 2025 11:12) (138/78 - 168/88)  BP(mean): --  RR: 18 (17 Jan 2025 11:12) (18 - 18)  SpO2: 100% (17 Jan 2025 11:12) (94% - 100%)    Parameters below as of 17 Jan 2025 11:12  Patient On (Oxygen Delivery Method): room air      CONSTITUTIONAL: NAD, well-groomed  EYES:  conjunctiva and sclera clear  ENMT: Moist oral mucosa  NECK: Supple, no palpable masses; no JVD  RESPIRATORY: Normal respiratory effort; lungs are clear to auscultation bilaterally  CARDIOVASCULAR: Regular rate and rhythm, normal S1 and S2, no murmur/rub/gallop; No lower extremity edema  ABDOMEN: Nontender to palpation, normoactive bowel sounds, no rebound/guarding  MUSCULOSKELETAL:  no clubbing or cyanosis of digits; no joint swelling or tenderness to palpation  PSYCH: A+O to person, place, and time; affect appropriate  SKIN: No rashes; no palpable lesions  LABS:    01-17    141  |  104  |  51[H]  ----------------------------<  85  3.9   |  20[L]  |  3.03[H]    Ca    8.8      17 Jan 2025 06:19  Mg     2.1     01-16            Urinalysis Basic - ( 17 Jan 2025 06:19 )    Color: x / Appearance: x / SG: x / pH: x  Gluc: 85 mg/dL / Ketone: x  / Bili: x / Urobili: x   Blood: x / Protein: x / Nitrite: x   Leuk Esterase: x / RBC: x / WBC x   Sq Epi: x / Non Sq Epi: x / Bacteria: x          RADIOLOGY & ADDITIONAL TESTS:  Results Reviewed:   Imaging Personally Reviewed:  Electrocardiogram Personally Reviewed:    COORDINATION OF CARE:  Care Discussed with Consultants/Other Providers [Y/N]:  Prior or Outpatient Records Reviewed [Y/N]:

## 2025-04-11 ENCOUNTER — APPOINTMENT (OUTPATIENT)
Dept: UROLOGY | Facility: CLINIC | Age: 65
End: 2025-04-11
Payer: MEDICAID

## 2025-04-11 VITALS
DIASTOLIC BLOOD PRESSURE: 108 MMHG | HEIGHT: 66 IN | WEIGHT: 183 LBS | BODY MASS INDEX: 29.41 KG/M2 | OXYGEN SATURATION: 100 % | TEMPERATURE: 98.2 F | SYSTOLIC BLOOD PRESSURE: 169 MMHG | HEART RATE: 63 BPM

## 2025-04-11 DIAGNOSIS — N13.8 BENIGN PROSTATIC HYPERPLASIA WITH LOWER URINARY TRACT SYMPMS: ICD-10-CM

## 2025-04-11 DIAGNOSIS — N40.1 BENIGN PROSTATIC HYPERPLASIA WITH LOWER URINARY TRACT SYMPMS: ICD-10-CM

## 2025-04-11 DIAGNOSIS — R97.20 ELEVATED PROSTATE, SPECIFIC ANTIGEN [PSA]: ICD-10-CM

## 2025-04-11 DIAGNOSIS — N35.911 UNSPECIFIED URETHRAL STRICTURE, MALE, MEATAL: ICD-10-CM

## 2025-04-11 PROCEDURE — 99204 OFFICE O/P NEW MOD 45 MIN: CPT

## 2025-04-15 LAB
APPEARANCE: CLEAR
BACTERIA UR CULT: NORMAL
BACTERIA: NEGATIVE /HPF
BILIRUBIN URINE: NEGATIVE
BLOOD URINE: NEGATIVE
CAST: 0 /LPF
COLOR: YELLOW
EPITHELIAL CELLS: 1 /HPF
GLUCOSE QUALITATIVE U: NEGATIVE MG/DL
KETONES URINE: NEGATIVE MG/DL
LEUKOCYTE ESTERASE URINE: NEGATIVE
MICROSCOPIC-UA: NORMAL
NITRITE URINE: NEGATIVE
PH URINE: 6.5
PROTEIN URINE: 300 MG/DL
RED BLOOD CELLS URINE: 0 /HPF
SPECIFIC GRAVITY URINE: 1.01
UROBILINOGEN URINE: 0.2 MG/DL
WHITE BLOOD CELLS URINE: 1 /HPF

## 2025-04-17 RX ORDER — NIFEDIPINE 30 MG/1
30 TABLET, EXTENDED RELEASE ORAL
Refills: 0 | Status: ACTIVE | COMMUNITY

## 2025-04-21 ENCOUNTER — APPOINTMENT (OUTPATIENT)
Dept: MRI IMAGING | Facility: CLINIC | Age: 65
End: 2025-04-21
Payer: MEDICAID

## 2025-04-21 PROCEDURE — 72195 MRI PELVIS W/O DYE: CPT

## 2025-04-29 ENCOUNTER — APPOINTMENT (OUTPATIENT)
Dept: UROLOGY | Facility: CLINIC | Age: 65
End: 2025-04-29
Payer: MEDICAID

## 2025-04-29 VITALS
HEART RATE: 69 BPM | WEIGHT: 181 LBS | DIASTOLIC BLOOD PRESSURE: 78 MMHG | HEIGHT: 66 IN | BODY MASS INDEX: 29.09 KG/M2 | SYSTOLIC BLOOD PRESSURE: 137 MMHG

## 2025-04-29 DIAGNOSIS — R97.20 ELEVATED PROSTATE, SPECIFIC ANTIGEN [PSA]: ICD-10-CM

## 2025-04-29 PROCEDURE — 99214 OFFICE O/P EST MOD 30 MIN: CPT

## 2025-04-29 PROCEDURE — G2211 COMPLEX E/M VISIT ADD ON: CPT | Mod: NC

## 2025-05-05 ENCOUNTER — NON-APPOINTMENT (OUTPATIENT)
Age: 65
End: 2025-05-05

## 2025-05-22 ENCOUNTER — APPOINTMENT (OUTPATIENT)
Dept: UROLOGY | Facility: CLINIC | Age: 65
End: 2025-05-22
Payer: MEDICAID

## 2025-05-22 ENCOUNTER — APPOINTMENT (OUTPATIENT)
Dept: UROLOGY | Facility: CLINIC | Age: 65
End: 2025-05-22

## 2025-05-22 VITALS
OXYGEN SATURATION: 99 % | SYSTOLIC BLOOD PRESSURE: 143 MMHG | HEART RATE: 72 BPM | DIASTOLIC BLOOD PRESSURE: 75 MMHG | TEMPERATURE: 97.8 F

## 2025-05-22 PROCEDURE — 55700: CPT

## 2025-05-22 PROCEDURE — 76999F: CUSTOM

## 2025-05-30 ENCOUNTER — NON-APPOINTMENT (OUTPATIENT)
Age: 65
End: 2025-05-30

## 2025-05-30 ENCOUNTER — APPOINTMENT (OUTPATIENT)
Dept: UROLOGY | Facility: CLINIC | Age: 65
End: 2025-05-30
Payer: MEDICAID

## 2025-05-30 VITALS
DIASTOLIC BLOOD PRESSURE: 96 MMHG | HEART RATE: 71 BPM | HEIGHT: 66 IN | BODY MASS INDEX: 29.09 KG/M2 | SYSTOLIC BLOOD PRESSURE: 158 MMHG | WEIGHT: 181 LBS | OXYGEN SATURATION: 98 % | TEMPERATURE: 98.8 F

## 2025-05-30 DIAGNOSIS — R97.20 ELEVATED PROSTATE, SPECIFIC ANTIGEN [PSA]: ICD-10-CM

## 2025-05-30 LAB — PROSTATE BIOPSY: NORMAL

## 2025-05-30 PROCEDURE — 99214 OFFICE O/P EST MOD 30 MIN: CPT

## 2025-05-30 RX ORDER — TADALAFIL 5 MG/1
5 TABLET ORAL
Qty: 90 | Refills: 1 | Status: ACTIVE | COMMUNITY
Start: 2025-05-30 | End: 1900-01-01

## 2025-07-10 ENCOUNTER — APPOINTMENT (OUTPATIENT)
Dept: NUCLEAR MEDICINE | Facility: IMAGING CENTER | Age: 65
End: 2025-07-10

## 2025-08-15 ENCOUNTER — APPOINTMENT (OUTPATIENT)
Dept: NUCLEAR MEDICINE | Facility: IMAGING CENTER | Age: 65
End: 2025-08-15
Payer: MEDICAID

## 2025-08-15 ENCOUNTER — OUTPATIENT (OUTPATIENT)
Dept: OUTPATIENT SERVICES | Facility: HOSPITAL | Age: 65
LOS: 1 days | End: 2025-08-15
Payer: MEDICAID

## 2025-08-15 DIAGNOSIS — Z00.8 ENCOUNTER FOR OTHER GENERAL EXAMINATION: ICD-10-CM

## 2025-08-15 PROCEDURE — 78830 RP LOCLZJ TUM SPECT W/CT 1: CPT | Mod: 26

## 2025-08-15 PROCEDURE — A9538: CPT

## 2025-08-15 PROCEDURE — 78830 RP LOCLZJ TUM SPECT W/CT 1: CPT
